# Patient Record
Sex: FEMALE | Race: WHITE | Employment: FULL TIME | URBAN - METROPOLITAN AREA
[De-identification: names, ages, dates, MRNs, and addresses within clinical notes are randomized per-mention and may not be internally consistent; named-entity substitution may affect disease eponyms.]

---

## 2017-05-11 ENCOUNTER — OFFICE VISIT (OUTPATIENT)
Dept: ORTHOPEDIC SURGERY | Age: 27
End: 2017-05-11

## 2017-05-11 VITALS — RESPIRATION RATE: 18 BRPM | DIASTOLIC BLOOD PRESSURE: 87 MMHG | SYSTOLIC BLOOD PRESSURE: 144 MMHG | TEMPERATURE: 98.1 F

## 2017-05-11 DIAGNOSIS — M54.12 CERVICAL RADICULAR PAIN: Primary | ICD-10-CM

## 2017-05-11 DIAGNOSIS — M54.2 NECK PAIN: ICD-10-CM

## 2017-05-11 RX ORDER — CYCLOBENZAPRINE HCL 10 MG
TABLET ORAL
Refills: 1 | COMMUNITY
Start: 2017-04-20 | End: 2017-05-11

## 2017-05-11 RX ORDER — GABAPENTIN 300 MG/1
CAPSULE ORAL
Qty: 60 CAP | Refills: 1 | Status: SHIPPED | OUTPATIENT
Start: 2017-05-11 | End: 2019-05-14 | Stop reason: SDUPTHER

## 2017-05-11 RX ORDER — METOPROLOL SUCCINATE 25 MG/1
12.5 TABLET, EXTENDED RELEASE ORAL
COMMUNITY
Start: 2014-02-24 | End: 2017-05-11

## 2017-05-11 RX ORDER — NAPROXEN 500 MG/1
TABLET ORAL
Qty: 60 TAB | Refills: 1 | Status: SHIPPED | OUTPATIENT
Start: 2017-05-11 | End: 2019-05-14 | Stop reason: CLARIF

## 2017-05-11 RX ORDER — IBUPROFEN 800 MG/1
800 TABLET ORAL
COMMUNITY
End: 2017-05-11

## 2017-05-11 NOTE — MR AVS SNAPSHOT
Visit Information Date & Time Provider Department Dept. Phone Encounter #  
 5/11/2017  2:30  [address], MD 4 [facility], [address] [facility] [phone] [id number] Follow-up Instructions Return for MRI/CT f/u. Upcoming Health Maintenance Date Due  
 HPV AGE 9Y-34Y (1 of 3 - Female 3 Dose Series) 11/17/2001 DTaP/Tdap/Td series (1 - Tdap) 11/17/2011 PAP AKA CERVICAL CYTOLOGY 11/17/2011 INFLUENZA AGE 9 TO ADULT 8/1/2017 Allergies as of 5/11/2017  Review Complete On: 5/11/2017 By: [address], MD  
 No Known Allergies Current Immunizations  Never Reviewed No immunizations on file. Not reviewed this visit You Were Diagnosed With   
  
 Codes Comments Cervical radicular pain    -  Primary ICD-10-CM: M54.12 
ICD-9-CM: 723.4 Neck pain     ICD-10-CM: M54.2 ICD-9-CM: 723.1 Vitals BP Temp Resp OB Status Smoking Status 144/87 (BP 1 Location: Right arm, BP Patient Position: Sitting) 98.1 °F (36.7 °C) (Oral) 18 Having regular periods Never Smoker Preferred Pharmacy Pharmacy Name Phone [address], [address] [place] [address] [phone] Your Updated Medication List  
  
   
This list is accurate as of: 5/11/17  3:46 PM.  Always use your most recent med list.  
  
  
  
  
 gabapentin 300 mg capsule Commonly known as:  NEURONTIN Take 1 cap po q hs x 1 week then increase to 2 caps po q hs  
  
 metoprolol tartrate 25 mg tablet Commonly known as:  LOPRESSOR Take 0.5 Tabs by mouth two (2) times a day. naproxen 500 mg tablet Commonly known as:  NAPROSYN Take 1 po q d-bid with food prn pain NEXPLANON 68 mg Impl Generic drug:  etonogestrel  
by SubDERmal route. Prescriptions Sent to Pharmacy Refills  
 gabapentin (NEURONTIN) 300 mg capsule 1 Sig: Take 1 cap po q hs x 1 week then increase to 2 caps po q hs  Class: Normal  
 Pharmacy: [id number], [address] Ph #: [phone]  
 naproxen (NAPROSYN) 500 mg tablet 1 Sig: Take 1 po q d-bid with food prn pain  
 Class: Normal  
 Pharmacy: [id number], [address] [facility] Ph #: [phone] We Performed the Following AMB POC XRAY, SPINE, CERVICAL; 4+ VIE [14535 CPT(R)] Follow-up Instructions Return for MRI/CT f/u. To-Do List   
 05/16/2017 Imaging:  MRI CERV SPINE WO CONT Patient Instructions Pinched Nerve in the Neck: Care Instructions Your Care Instructions A pinched nerve in the neck happens when a vertebra or disc in the upper part of your spine is damaged. This damage can happen because of an injury. Or it can just happen with age. The changes caused by the damage may put pressure on a nearby nerve root, pinching it. This causes symptoms such as sharp pain in your neck, shoulder, arm, or back. You may also have tingling or numbness. Sometimes it makes your arm weaker. The symptoms are usually worse when you turn your head or strain your neck. For many people, the symptoms get better over time and finally go away. Early treatment usually includes medicines for pain and swelling. Sometimes physical therapy and special exercises may help. Follow-up care is a key part of your treatment and safety. Be sure to make and go to all appointments, and call your doctor if you are having problems. It's also a good idea to know your test results and keep a list of the medicines you take. How can you care for yourself at home? · Be safe with medicines. Read and follow all instructions on the label. ¨ If the doctor gave you a prescription medicine for pain, take it as prescribed. ¨ If you are not taking a prescription pain medicine, ask your doctor if you can take an over-the-counter medicine. · Try using a heating pad on a low or medium setting for 15 to 20 minutes every 2 or 3 hours. Try a warm shower in place of one session with the heating pad. You can also buy single-use heat wraps that last up to 8 hours. · You can also try an ice pack for 10 to 15 minutes every 2 to 3 hours. There isn't strong evidence that either heat or ice will help. But you can try them to see if they help you. · Don't spend too long in one position. Take short breaks to move around and change positions. · Wear a seat belt and shoulder harness when you are in a car. · Sleep with a pillow under your head and neck that keeps your neck straight. · If you were given a neck brace (cervical collar) to limit neck motion, wear it as instructed for as many days as your doctor tells you to. Do not wear it longer than you were told to. Wearing a brace for too long can lead to neck stiffness and can weaken the neck muscles. · Follow your doctor's instructions for gentle neck-stretching exercises. · Do not smoke. Smoking can slow healing of your discs. If you need help quitting, talk to your doctor about stop-smoking programs and medicines. These can increase your chances of quitting for good. · Avoid strenuous work or exercise until your doctor says it is okay. When should you call for help? Call 911 anytime you think you may need emergency care. For example, call if: 
· You are unable to move an arm or a leg at all. Call your doctor now or seek immediate medical care if: 
· You have new or worse symptoms in your arms, legs, chest, belly, or buttocks. Symptoms may include: ¨ Numbness or tingling. ¨ Weakness. ¨ Pain. · You lose bladder or bowel control. Watch closely for changes in your health, and be sure to contact your doctor if: 
· You are not getting better as expected. Where can you learn more? Go to http://drew-mariya.info/.  
Enter W033 in the search box to learn more about \"Pinched Nerve in the Neck: Care Instructions. \" Current as of: May 23, 2016 Content Version: 11.2 © 4356-7853 [employer]. Care instructions adapted under license by [employer] (which disclaims liability or warranty for this information). If you have questions about a medical condition or this instruction, always ask your healthcare professional. [address] any warranty or liability for your use of this information. Introducing [facility]! Toribio Nyhan introduces Matchbox patient portal. Now you can access parts of your medical record, email your doctor's office, and request medication refills online. 1. In your internet browser, go to [email or web address] 2. Click on the First Time User? Click Here link in the Sign In box. You will see the New Member Sign Up page. 3. Enter your Matchbox Access Code exactly as it appears below. You will not need to use this code after youve completed the sign-up process. If you do not sign up before the expiration date, you must request a new code. · Matchbox Access Code: [email or web address] Expires: 8/9/2017  2:02 PM 
 
4. Enter the last four digits of your Social Security Number (xxxx) and Date of Birth (mm/dd/yyyy) as indicated and click Submit. You will be taken to the next sign-up page. 5. Create a Matchbox ID. This will be your Matchbox login ID and cannot be changed, so think of one that is secure and easy to remember. 6. Create a Matchbox password. You can change your password at any time. 7. Enter your Password Reset Question and Answer. This can be used at a later time if you forget your password. 8. Enter your e-mail address. You will receive e-mail notification when new information is available in [address] 9. Click Sign Up. You can now view and download portions of your medical record. 10. Click the Download Summary menu link to download a portable copy of your medical information. If you have questions, please visit the Frequently Asked Questions section of the [facility] website. Remember, [employer] is NOT to be used for urgent needs. For medical emergencies, dial 911. Now available from your iPhone and Android! Please provide this summary of care documentation to your next provider. Your primary care clinician is listed as NOT ON FILE. If you have any questions after today's visit, please call [phone].
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
## 2017-05-11 NOTE — PATIENT INSTRUCTIONS
Pinched Nerve in the Neck: Care Instructions  Your Care Instructions  A pinched nerve in the neck happens when a vertebra or disc in the upper part of your spine is damaged. This damage can happen because of an injury. Or it can just happen with age. The changes caused by the damage may put pressure on a nearby nerve root, pinching it. This causes symptoms such as sharp pain in your neck, shoulder, arm, or back. You may also have tingling or numbness. Sometimes it makes your arm weaker. The symptoms are usually worse when you turn your head or strain your neck. For many people, the symptoms get better over time and finally go away. Early treatment usually includes medicines for pain and swelling. Sometimes physical therapy and special exercises may help. Follow-up care is a key part of your treatment and safety. Be sure to make and go to all appointments, and call your doctor if you are having problems. It's also a good idea to know your test results and keep a list of the medicines you take. How can you care for yourself at home? · Be safe with medicines. Read and follow all instructions on the label. ¨ If the doctor gave you a prescription medicine for pain, take it as prescribed. ¨ If you are not taking a prescription pain medicine, ask your doctor if you can take an over-the-counter medicine. · Try using a heating pad on a low or medium setting for 15 to 20 minutes every 2 or 3 hours. Try a warm shower in place of one session with the heating pad. You can also buy single-use heat wraps that last up to 8 hours. · You can also try an ice pack for 10 to 15 minutes every 2 to 3 hours. There isn't strong evidence that either heat or ice will help. But you can try them to see if they help you. · Don't spend too long in one position. Take short breaks to move around and change positions. · Wear a seat belt and shoulder harness when you are in a car.   · Sleep with a pillow under your head and neck that keeps your neck straight. · If you were given a neck brace (cervical collar) to limit neck motion, wear it as instructed for as many days as your doctor tells you to. Do not wear it longer than you were told to. Wearing a brace for too long can lead to neck stiffness and can weaken the neck muscles. · Follow your doctor's instructions for gentle neck-stretching exercises. · Do not smoke. Smoking can slow healing of your discs. If you need help quitting, talk to your doctor about stop-smoking programs and medicines. These can increase your chances of quitting for good. · Avoid strenuous work or exercise until your doctor says it is okay. When should you call for help? Call 911 anytime you think you may need emergency care. For example, call if:  · You are unable to move an arm or a leg at all. Call your doctor now or seek immediate medical care if:  · You have new or worse symptoms in your arms, legs, chest, belly, or buttocks. Symptoms may include:  ¨ Numbness or tingling. ¨ Weakness. ¨ Pain. · You lose bladder or bowel control. Watch closely for changes in your health, and be sure to contact your doctor if:  · You are not getting better as expected. Where can you learn more? Go to http://drew-mariya.info/. Enter Q607 in the search box to learn more about \"Pinched Nerve in the Neck: Care Instructions. \"  Current as of: May 23, 2016  Content Version: 11.2  © 3551-6658 [employer]. Care instructions adapted under license by [employer] (which disclaims liability or warranty for this information). If you have questions about a medical condition or this instruction, always ask your healthcare professional. [place] [address] any warranty or liability for your use of this information.

## 2017-10-10 RX ORDER — GABAPENTIN 300 MG/1
600 CAPSULE ORAL
Qty: 60 CAP | Refills: 1 | OUTPATIENT
Start: 2017-10-10

## 2017-10-10 NOTE — TELEPHONE ENCOUNTER
Please contact the patient for scheduling per refusal reason below. Thanks.      Refused by: Sarika العلي NP  Refusal reason: Appt required, please call patient (MRI FU noted, has not been seen since May)

## 2017-10-11 NOTE — TELEPHONE ENCOUNTER
Both numbers on pt acct are disconnected if pt calls pls make appt for med refill see previous message to schedule

## 2018-12-03 ENCOUNTER — APPOINTMENT (OUTPATIENT)
Dept: GENERAL RADIOLOGY | Age: 28
End: 2018-12-03
Attending: PHYSICIAN ASSISTANT
Payer: SELF-PAY

## 2018-12-03 ENCOUNTER — HOSPITAL ENCOUNTER (EMERGENCY)
Age: 28
Discharge: HOME | End: 2018-12-03
Attending: EMERGENCY MEDICINE
Payer: SELF-PAY

## 2018-12-03 VITALS
OXYGEN SATURATION: 96 % | RESPIRATION RATE: 16 BRPM | BODY MASS INDEX: 45 KG/M2 | HEART RATE: 90 BPM | HEIGHT: 66 IN | DIASTOLIC BLOOD PRESSURE: 91 MMHG | WEIGHT: 280 LBS | SYSTOLIC BLOOD PRESSURE: 146 MMHG | TEMPERATURE: 97.7 F

## 2018-12-03 DIAGNOSIS — S93.402A SPRAIN OF LEFT ANKLE, UNSPECIFIED LIGAMENT, INITIAL ENCOUNTER: Primary | ICD-10-CM

## 2018-12-03 PROCEDURE — L4350 ANKLE CONTROL ORTHO PRE OTS: HCPCS

## 2018-12-03 PROCEDURE — 99283 EMERGENCY DEPT VISIT LOW MDM: CPT

## 2018-12-03 PROCEDURE — 73610 X-RAY EXAM OF ANKLE: CPT

## 2018-12-03 NOTE — ED PROVIDER NOTES
EMERGENCY DEPARTMENT HISTORY AND PHYSICAL EXAM    Date: (Not on file)  Patient Name: Jeremy Forde    History of Presenting Illness     Chief Complaint   Patient presents with    Ankle Pain         History Provided By: Patient    Chief Complaint: ankle pain  Duration: 1 Days  Timing:  Acute  Location: left ankle  Quality: Aching  Severity: Moderate  Modifying Factors: twisted ankle last night; walking worsens pain  Associated Symptoms: denies any other associated signs or symptoms      Additional History (Context): Jeremy Forde is a 29 y.o. female with No significant past medical history who presents with left ankle pain. Twisted ankle as she stepped into an uneven part of her driveway/hole when she got out of her car last evening. Report increased pain with weight bearing and swelling this morning. NO hx of fx but does have hx of left ankle sprains. Denies numbness, tingling. No other injuries or complaints. PCP: [place], Not On File    Current Outpatient Medications   Medication Sig Dispense Refill    gabapentin (NEURONTIN) 300 mg capsule Take 1 cap po q hs x 1 week then increase to 2 caps po q hs 60 Cap 1    naproxen (NAPROSYN) 500 mg tablet Take 1 po q d-bid with food prn pain 60 Tab 1    etonogestrel (NEXPLANON) 68 mg impl by SubDERmal route.  metoprolol (LOPRESSOR) 25 mg tablet Take 0.5 Tabs by mouth two (2) times a day.  27 Tab 6       Past History     Past Medical History:  Past Medical History:   Diagnosis Date    Anemia     Hypertension     pregnancy induced HTN    Mass of heart     Spon  w/o complication     SVT (supraventricular tachycardia) (HCC)     SVT (supraventricular tachycardia) (HCC)        Past Surgical History:  Past Surgical History:   Procedure Laterality Date    HX GYN      left ovarian cyst removed    HX SVT ABLATION      HX TONSIL AND ADENOIDECTOMY      HX TONSILLECTOMY  no date noted in HX    tonsils, adnoids & tues       Family History:  Family History   Problem Relation Age of Onset    Emphysema Mother     Cancer Neg Hx     Diabetes Neg Hx     Heart Disease Neg Hx     Hypertension Neg Hx     Stroke Neg Hx        Social History:  Social History     Tobacco Use    Smoking status: Never Smoker    Smokeless tobacco: Never Used   Substance Use Topics    Alcohol use: Yes     Alcohol/week: 1.2 oz     Types: 2 Shots of liquor per week    Drug use: No       Allergies:  No Known Allergies      Review of Systems   Review of Systems   Musculoskeletal: Positive for arthralgias and joint swelling. Skin: Negative for wound. All other systems reviewed and are negative. All Other Systems Negative  Physical Exam   There were no vitals filed for this visit. Physical Exam   Constitutional: She appears well-developed and well-nourished. No distress. HENT:   Head: Normocephalic and atraumatic. Mouth/Throat: Oropharynx is clear and moist.   Eyes: Conjunctivae are normal.   Neck: Normal range of motion. Musculoskeletal:        Left knee: Normal.        Right ankle: Normal.        Left ankle: She exhibits decreased range of motion and swelling. She exhibits no ecchymosis, no deformity, no laceration and normal pulse. Tenderness. Lateral malleolus tenderness found. No medial malleolus and no head of 5th metatarsal tenderness found. Achilles tendon normal.        Left foot: Normal.   Neurological: She is alert. Skin: Skin is warm and dry. She is not diaphoretic. Psychiatric: She has a normal mood and affect. Diagnostic Study Results     Labs -   No results found for this or any previous visit (from the past 12 hour(s)). Radiologic Studies -   XR ANKLE LT MIN 3 V    (Results Pending)     CT Results  (Last 48 hours)    None        CXR Results  (Last 48 hours)    None            Medical Decision Making   I am the first provider for this patient.     I reviewed the vital signs, available nursing notes, past medical history, past surgical history, family history and social history. Vital Signs-Reviewed the patient's vital signs. Records Reviewed: Nursing Notes    Procedures:  Procedures    Provider Notes (Medical Decision Making): pt c/o left ankle pain s/p stepping into uneven part of driveway last evening. +swelling and tenderness lateral joint. No fx on xray. Will air splint, crutches and give ortho f/u. nsaids for pain. Pt agrees with plan. MED RECONCILIATION:  No current facility-administered medications for this encounter. Current Outpatient Medications   Medication Sig    gabapentin (NEURONTIN) 300 mg capsule Take 1 cap po q hs x 1 week then increase to 2 caps po q hs    naproxen (NAPROSYN) 500 mg tablet Take 1 po q d-bid with food prn pain    etonogestrel (NEXPLANON) 68 mg impl by SubDERmal route.  metoprolol (LOPRESSOR) 25 mg tablet Take 0.5 Tabs by mouth two (2) times a day. Disposition:  D/c to home    DISCHARGE NOTE:     Pt has been reexamined. Patient has no new complaints, changes, or physical findings. Care plan outlined and precautions discussed. Results of xray were reviewed with the patient. All medications were reviewed with the patient; will d/c home with nsaids, crutches. All of pt's questions and concerns were addressed. Patient was instructed and agrees to follow up with pcp/ortho, as well as to return to the ED upon further deterioration. Patient is ready to go home. Follow-up Information    None         Current Discharge Medication List            Diagnosis     Clinical Impression: No diagnosis found.

## 2018-12-03 NOTE — ED TRIAGE NOTES
The patient presents for evaluation of left ankle pain that began last night. States, \"I was walking on my driveway and stepped in a hole. \"

## 2018-12-03 NOTE — DISCHARGE INSTRUCTIONS
Ankle Sprain: Care Instructions  Your Care Instructions    An ankle sprain can happen when you twist your ankle. The ligaments that support the ankle can get stretched and torn. Often the ankle is swollen and painful. Ankle sprains may take from several weeks to several months to heal. Usually, the more pain and swelling you have, the more severe your ankle sprain is and the longer it will take to heal. You can heal faster and regain strength in your ankle with good home treatment. It is very important to give your ankle time to heal completely, so that you do not easily hurt your ankle again. Follow-up care is a key part of your treatment and safety. Be sure to make and go to all appointments, and call your doctor if you are having problems. It's also a good idea to know your test results and keep a list of the medicines you take. How can you care for yourself at home? · Prop up your foot on pillows as much as possible for the next 3 days. Try to keep your ankle above the level of your heart. This will help reduce the swelling. · Follow your doctor's directions for wearing a splint or elastic bandage. Wrapping the ankle may help reduce or prevent swelling. · Your doctor may give you a splint, a brace, an air stirrup, or another form of ankle support to protect your ankle until it is healed. Wear it as directed while your ankle is healing. Do not remove it unless your doctor tells you to. After your ankle has healed, ask your doctor whether you should wear the brace when you exercise. · Put ice or cold packs on your injured ankle for 10 to 20 minutes at a time. Try to do this every 1 to 2 hours for the next 3 days (when you are awake) or until the swelling goes down. Put a thin cloth between the ice and your skin. · You may need to use crutches until you can walk without pain. If you do use crutches, try to bear some weight on your injured ankle if you can do so without pain.  This helps the ankle heal.  · Take pain medicines exactly as directed. ? If the doctor gave you a prescription medicine for pain, take it as prescribed. ? If you are not taking a prescription pain medicine, ask your doctor if you can take an over-the-counter medicine. · If you have been given ankle exercises to do at home, do them exactly as instructed. These can promote healing and help prevent lasting weakness. When should you call for help? Call your doctor now or seek immediate medical care if:    · Your pain is getting worse.     · Your swelling is getting worse.     · Your splint feels too tight or you are unable to loosen it.    Watch closely for changes in your health, and be sure to contact your doctor if:    · You are not getting better after 1 week. Where can you learn more? Go to http://drew-mariya.info/. Enter Q992 in the search box to learn more about \"Ankle Sprain: Care Instructions. \"  Current as of: November 29, 2017  Content Version: 11.8  © 2976-9071 [employer]. Care instructions adapted under license by [employer] (which disclaims liability or warranty for this information). If you have questions about a medical condition or this instruction, always ask your healthcare professional. [place] [address] any warranty or liability for your use of this information.

## 2018-12-03 NOTE — ED NOTES
I have reviewed discharge instructions with the patient. The patient verbalized understanding. Discharged home ambulatory with crutches, in stable condition.

## 2018-12-03 NOTE — ED NOTES
Aircast applied to left ankle. Instructed patient on proper use of crutches. Patient provided returned demonstration on proper use of crutches.

## 2019-04-23 ENCOUNTER — HOSPITAL ENCOUNTER (OUTPATIENT)
Dept: LAB | Age: 29
Discharge: HOME | End: 2019-04-23
Payer: MEDICAID

## 2019-04-23 ENCOUNTER — OFFICE VISIT (OUTPATIENT)
Dept: FAMILY MEDICINE CLINIC | Facility: CLINIC | Age: 29
End: 2019-04-23

## 2019-04-23 VITALS
WEIGHT: 286 LBS | HEART RATE: 83 BPM | OXYGEN SATURATION: 97 % | DIASTOLIC BLOOD PRESSURE: 80 MMHG | BODY MASS INDEX: 45.96 KG/M2 | HEIGHT: 66 IN | SYSTOLIC BLOOD PRESSURE: 152 MMHG | TEMPERATURE: 99.4 F | RESPIRATION RATE: 16 BRPM

## 2019-04-23 DIAGNOSIS — I47.1 SVT (SUPRAVENTRICULAR TACHYCARDIA) (HCC): ICD-10-CM

## 2019-04-23 DIAGNOSIS — Z12.4 CERVICAL CANCER SCREENING: ICD-10-CM

## 2019-04-23 DIAGNOSIS — F32.A DEPRESSION, UNSPECIFIED DEPRESSION TYPE: Primary | ICD-10-CM

## 2019-04-23 PROBLEM — F33.0 DEPRESSION, MAJOR, RECURRENT, MILD (HCC): Status: ACTIVE | Noted: 2019-04-23

## 2019-04-23 PROBLEM — E66.01 OBESITY, MORBID (HCC): Status: ACTIVE | Noted: 2019-04-23

## 2019-04-23 LAB
ALBUMIN SERPL-MCNC: 3.9 G/DL (ref 3.4–5)
ALBUMIN/GLOB SERPL: 1.1 {RATIO} (ref 0.8–1.7)
ALP SERPL-CCNC: 71 U/L (ref 45–117)
ALT SERPL-CCNC: 27 U/L (ref 13–56)
ANION GAP SERPL CALC-SCNC: 7 MMOL/L (ref 3–18)
AST SERPL-CCNC: 14 U/L (ref 15–37)
BILIRUB SERPL-MCNC: 0.5 MG/DL (ref 0.2–1)
BUN SERPL-MCNC: 10 MG/DL (ref 7–18)
BUN/CREAT SERPL: 11 (ref 12–20)
CALCIUM SERPL-MCNC: 9.3 MG/DL (ref 8.5–10.1)
CHLORIDE SERPL-SCNC: 108 MMOL/L (ref 100–108)
CHOLEST SERPL-MCNC: 226 MG/DL
CO2 SERPL-SCNC: 24 MMOL/L (ref 21–32)
CREAT SERPL-MCNC: 0.91 MG/DL (ref 0.6–1.3)
GLOBULIN SER CALC-MCNC: 3.4 G/DL (ref 2–4)
GLUCOSE SERPL-MCNC: 89 MG/DL (ref 74–99)
HDLC SERPL-MCNC: 39 MG/DL (ref 40–60)
HDLC SERPL: 5.8 {RATIO} (ref 0–5)
LDLC SERPL CALC-MCNC: 131.8 MG/DL (ref 0–100)
LIPID PROFILE,FLP: ABNORMAL
POTASSIUM SERPL-SCNC: 4.3 MMOL/L (ref 3.5–5.5)
PROT SERPL-MCNC: 7.3 G/DL (ref 6.4–8.2)
SODIUM SERPL-SCNC: 139 MMOL/L (ref 136–145)
TRIGL SERPL-MCNC: 276 MG/DL (ref ?–150)
VLDLC SERPL CALC-MCNC: 55.2 MG/DL

## 2019-04-23 PROCEDURE — 85025 COMPLETE CBC W/AUTO DIFF WBC: CPT

## 2019-04-23 PROCEDURE — 36415 COLL VENOUS BLD VENIPUNCTURE: CPT

## 2019-04-23 PROCEDURE — 80053 COMPREHEN METABOLIC PANEL: CPT

## 2019-04-23 PROCEDURE — 80061 LIPID PANEL: CPT

## 2019-04-23 RX ORDER — METOPROLOL TARTRATE 25 MG/1
12.5 TABLET, FILM COATED ORAL 2 TIMES DAILY
Qty: 30 TAB | Refills: 6 | Status: SHIPPED | OUTPATIENT
Start: 2019-04-23 | End: 2020-07-17 | Stop reason: SDUPTHER

## 2019-04-23 RX ORDER — ESCITALOPRAM OXALATE 10 MG/1
10 TABLET ORAL DAILY
Qty: 30 TAB | Refills: 1 | Status: SHIPPED | OUTPATIENT
Start: 2019-04-23 | End: 2019-05-14 | Stop reason: SDUPTHER

## 2019-04-23 NOTE — PROGRESS NOTES
Chief Complaint   Patient presents with    New Patient    Depression         Is someone accompanying this pt? no    Is the patient using any DME equipment during OV? no    Depression Screening:  3 most recent PHQ Screens 4/23/2019   Little interest or pleasure in doing things Several days   Feeling down, depressed, irritable, or hopeless Nearly every day   Total Score PHQ 2 4   Trouble falling or staying asleep, or sleeping too much More than half the days   Feeling tired or having little energy More than half the days   Poor appetite, weight loss, or overeating Several days   Feeling bad about yourself - or that you are a failure or have let yourself or your family down Nearly every day   Trouble concentrating on things such as school, work, reading, or watching TV Not at all   Moving or speaking so slowly that other people could have noticed; or the opposite being so fidgety that others notice Not at all   Thoughts of being better off dead, or hurting yourself in some way Not at all   PHQ 9 Score 12   How difficult have these problems made it for you to do your work, take care of your home and get along with others Very difficult           Health Maintenance reviewed and discussed per provider. Pt is due for   Health Maintenance Due   Topic    DTaP/Tdap/Td series (1 - Tdap)    PAP AKA CERVICAL CYTOLOGY     Please order/place referral if appropriate. Pt currently taking Antiplatelet therapy? no      Coordination of Care:  1. Have you been to the ER, urgent care clinic since your last visit? Hospitalized since your last visit? no    2. Have you seen or consulted any other health care providers outside of the [address] [place] [address] since your last visit? Include any pap smears or colon screening. no    Please see Red banners under Allergies, Med rec, Immunizations to remove outside inquires. All correct information has been verified with patient and added to chart.

## 2019-04-23 NOTE — PROGRESS NOTES
Rebeca Morales is a 29 y.o. female presenting today for New Patient and Depression  . HPI:  Rebeca Morales presents to the office today to establish care with the practice. Has a past medical history for palpitations, depression, obesity and supraventricular tachycardia. She presents today stating she feels well. She reports she is here to establish care with a new PCP. She denies any chest pain, palpitation or lower extremity edema. She denies any cough, wheezing or abdominal pain. She is negative for headache or dizziness. Patient reports she is currently taking metoprolol/Lopressor for her SVT. Review of Systems   Respiratory: Negative for cough and hemoptysis. Cardiovascular: Positive for palpitations. Negative for chest pain and leg swelling. Neurological: Negative for dizziness and headaches. Psychiatric/Behavioral: Positive for depression. No Known Allergies    Current Outpatient Medications   Medication Sig Dispense Refill    metoprolol tartrate (LOPRESSOR) 25 mg tablet Take 0.5 Tabs by mouth two (2) times a day. 30 Tab 6    escitalopram oxalate (LEXAPRO) 10 mg tablet Take 1 Tab by mouth daily. 30 Tab 1    etonogestrel (NEXPLANON) 68 mg impl by SubDERmal route.       gabapentin (NEURONTIN) 300 mg capsule Take 1 cap po q hs x 1 week then increase to 2 caps po q hs 60 Cap 1    naproxen (NAPROSYN) 500 mg tablet Take 1 po q d-bid with food prn pain 60 Tab 1       Past Medical History:   Diagnosis Date    Anemia     Hypertension     pregnancy induced HTN    Mass of heart     Spon  w/o complication     SVT (supraventricular tachycardia) (HCC)     SVT (supraventricular tachycardia) (HCC)        Past Surgical History:   Procedure Laterality Date    HX GYN      left ovarian cyst removed    HX SVT ABLATION      HX TONSIL AND ADENOIDECTOMY      HX TONSILLECTOMY  no date noted in HX    tonsils, adnoids & tues       Social History     Socioeconomic History    Marital status:      Spouse name: Not on file    Number of children: Not on file    Years of education: Not on file    Highest education level: Not on file   Occupational History    Not on file   Social Needs    Financial resource strain: Not on file    Food insecurity:     Worry: Not on file     Inability: Not on file    Transportation needs:     Medical: Not on file     Non-medical: Not on file   Tobacco Use    Smoking status: Never Smoker    Smokeless tobacco: Never Used   Substance and Sexual Activity    Alcohol use: Yes     Alcohol/week: 1.2 oz     Types: 2 Shots of liquor per week    Drug use: No    Sexual activity: Yes     Partners: Male     Birth control/protection: None   Lifestyle    Physical activity:     Days per week: Not on file     Minutes per session: Not on file    Stress: Not on file   Relationships    Social connections:     Talks on phone: Not on file     Gets together: Not on file     Attends Yazidism service: Not on file     Active member of club or organization: Not on file     Attends meetings of clubs or organizations: Not on file     Relationship status: Not on file    Intimate partner violence:     Fear of current or ex partner: Not on file     Emotionally abused: Not on file     Physically abused: Not on file     Forced sexual activity: Not on file   Other Topics Concern    Not on file   Social History Narrative    ** Merged History Encounter **            Patient does not have an advanced directive on file    Vitals:    04/23/19 1419   BP: 152/80   Pulse: 83   Resp: 16   Temp: 99.4 °F (37.4 °C)   TempSrc: Tympanic   SpO2: 97%   Weight: 286 lb (129.7 kg)   Height: 5' 6\" (1.676 m)   PainSc:   6   PainLoc: Back   LMP: 04/01/2019       Physical Exam   Cardiovascular: Normal rate, regular rhythm and normal heart sounds. Pulmonary/Chest: Effort normal and breath sounds normal.   Psychiatric: She is agitated.    Nursing note and vitals reviewed. Hospital Outpatient Visit on 04/23/2019   Component Date Value Ref Range Status    WBC 04/23/2019 8.0  4.6 - 13.2 K/uL Final    RBC 04/23/2019 4.96  4.20 - 5.30 M/uL Final    HGB 04/23/2019 14.3  12.0 - 16.0 g/dL Final    HCT 04/23/2019 43.9  35.0 - 45.0 % Final    MCV 04/23/2019 88.5  74.0 - 97.0 FL Final    MCH 04/23/2019 28.8  24.0 - 34.0 PG Final    MCHC 04/23/2019 32.6  31.0 - 37.0 g/dL Final    RDW 04/23/2019 13.0  11.6 - 14.5 % Final    PLATELET 26/76/8744 252  135 - 420 K/uL Final    MPV 04/23/2019 10.7  9.2 - 11.8 FL Final    NEUTROPHILS 04/23/2019 59  40 - 73 % Final    LYMPHOCYTES 04/23/2019 31  21 - 52 % Final    MONOCYTES 04/23/2019 6  3 - 10 % Final    EOSINOPHILS 04/23/2019 3  0 - 5 % Final    BASOPHILS 04/23/2019 1  0 - 2 % Final    ABS. NEUTROPHILS 04/23/2019 4.8  1.8 - 8.0 K/UL Final    ABS. LYMPHOCYTES 04/23/2019 2.5  0.9 - 3.6 K/UL Final    ABS. MONOCYTES 04/23/2019 0.5  0.05 - 1.2 K/UL Final    ABS. EOSINOPHILS 04/23/2019 0.3  0.0 - 0.4 K/UL Final    ABS. BASOPHILS 04/23/2019 0.1  0.0 - 0.1 K/UL Final    DF 04/23/2019 AUTOMATED    Final    LIPID PROFILE 04/23/2019        Final    Cholesterol, total 04/23/2019 226* <200 MG/DL Final    Triglyceride 04/23/2019 276* <150 MG/DL Final    Comment: The drugs N-acetylcysteine (NAC) and  Metamiszole have been found to cause falsely  low results in this chemical assay. Please  be sure to submit blood samples obtained  BEFORE administration of either of these  drugs to assure correct results.       HDL Cholesterol 04/23/2019 39* 40 - 60 MG/DL Final    LDL, calculated 04/23/2019 131.8* 0 - 100 MG/DL Final    VLDL, calculated 04/23/2019 55.2  MG/DL Final    CHOL/HDL Ratio 04/23/2019 5.8* 0 - 5.0   Final    Sodium 04/23/2019 139  136 - 145 mmol/L Final    Potassium 04/23/2019 4.3  3.5 - 5.5 mmol/L Final    Chloride 04/23/2019 108  100 - 108 mmol/L Final    CO2 04/23/2019 24  21 - 32 mmol/L Final    Anion gap 04/23/2019 7  3.0 - 18 mmol/L Final    Glucose 04/23/2019 89  74 - 99 mg/dL Final    BUN 04/23/2019 10  7.0 - 18 MG/DL Final    Creatinine 04/23/2019 0.91  0.6 - 1.3 MG/DL Final    BUN/Creatinine ratio 04/23/2019 11* 12 - 20   Final    GFR est AA 04/23/2019 >60  >60 ml/min/1.73m2 Final    GFR est non-AA 04/23/2019 >60  >60 ml/min/1.73m2 Final    Comment: (NOTE)  Estimated GFR is calculated using the Modification of Diet in Renal   Disease (MDRD) Study equation, reported for both  Americans   (GFRAA) and non- Americans (GFRNA), and normalized to 1.73m2   body surface area. The physician must decide which value applies to   the patient. The MDRD study equation should only be used in   individuals age 25 or older. It has not been validated for the   following: pregnant women, patients with serious comorbid conditions,   or on certain medications, or persons with extremes of body size,   muscle mass, or nutritional status.  Calcium 04/23/2019 9.3  8.5 - 10.1 MG/DL Final    Bilirubin, total 04/23/2019 0.5  0.2 - 1.0 MG/DL Final    ALT (SGPT) 04/23/2019 27  13 - 56 U/L Final    AST (SGOT) 04/23/2019 14* 15 - 37 U/L Final    Alk.  phosphatase 04/23/2019 71  45 - 117 U/L Final    Protein, total 04/23/2019 7.3  6.4 - 8.2 g/dL Final    Albumin 04/23/2019 3.9  3.4 - 5.0 g/dL Final    Globulin 04/23/2019 3.4  2.0 - 4.0 g/dL Final    A-G Ratio 04/23/2019 1.1  0.8 - 1.7   Final       .  Results for orders placed or performed during the hospital encounter of 04/23/19   CBC WITH AUTOMATED DIFF   Result Value Ref Range    WBC 8.0 4.6 - 13.2 K/uL    RBC 4.96 4.20 - 5.30 M/uL    HGB 14.3 12.0 - 16.0 g/dL    HCT 43.9 35.0 - 45.0 %    MCV 88.5 74.0 - 97.0 FL    MCH 28.8 24.0 - 34.0 PG    MCHC 32.6 31.0 - 37.0 g/dL    RDW 13.0 11.6 - 14.5 %    PLATELET 098 700 - 387 K/uL    MPV 10.7 9.2 - 11.8 FL    NEUTROPHILS 59 40 - 73 %    LYMPHOCYTES 31 21 - 52 %    MONOCYTES 6 3 - 10 %    EOSINOPHILS 3 0 - 5 % BASOPHILS 1 0 - 2 %    ABS. NEUTROPHILS 4.8 1.8 - 8.0 K/UL    ABS. LYMPHOCYTES 2.5 0.9 - 3.6 K/UL    ABS. MONOCYTES 0.5 0.05 - 1.2 K/UL    ABS. EOSINOPHILS 0.3 0.0 - 0.4 K/UL    ABS. BASOPHILS 0.1 0.0 - 0.1 K/UL    DF AUTOMATED     LIPID PANEL   Result Value Ref Range    LIPID PROFILE          Cholesterol, total 226 (H) <200 MG/DL    Triglyceride 276 (H) <150 MG/DL    HDL Cholesterol 39 (L) 40 - 60 MG/DL    LDL, calculated 131.8 (H) 0 - 100 MG/DL    VLDL, calculated 55.2 MG/DL    CHOL/HDL Ratio 5.8 (H) 0 - 5.0     METABOLIC PANEL, COMPREHENSIVE   Result Value Ref Range    Sodium 139 136 - 145 mmol/L    Potassium 4.3 3.5 - 5.5 mmol/L    Chloride 108 100 - 108 mmol/L    CO2 24 21 - 32 mmol/L    Anion gap 7 3.0 - 18 mmol/L    Glucose 89 74 - 99 mg/dL    BUN 10 7.0 - 18 MG/DL    Creatinine 0.91 0.6 - 1.3 MG/DL    BUN/Creatinine ratio 11 (L) 12 - 20      GFR est AA >60 >60 ml/min/1.73m2    GFR est non-AA >60 >60 ml/min/1.73m2    Calcium 9.3 8.5 - 10.1 MG/DL    Bilirubin, total 0.5 0.2 - 1.0 MG/DL    ALT (SGPT) 27 13 - 56 U/L    AST (SGOT) 14 (L) 15 - 37 U/L    Alk. phosphatase 71 45 - 117 U/L    Protein, total 7.3 6.4 - 8.2 g/dL    Albumin 3.9 3.4 - 5.0 g/dL    Globulin 3.4 2.0 - 4.0 g/dL    A-G Ratio 1.1 0.8 - 1.7         Assessment / Plan:      ICD-10-CM ICD-9-CM    1. Depression, unspecified depression type F32.9 311 escitalopram oxalate (LEXAPRO) 10 mg tablet   2. SVT (supraventricular tachycardia) (HCC) I47.1 427.89 metoprolol tartrate (LOPRESSOR) 25 mg tablet      CBC WITH AUTOMATED DIFF      LIPID PANEL      METABOLIC PANEL, COMPREHENSIVE   3. Cervical cancer screening Z12.4 V76.2 REFERRAL TO OBSTETRICS AND GYNECOLOGY     Depression- Lexapro rx  Fasting labs   Metoprolol rx refilled  F/u prn      Follow-up and Dispositions    · Return in about 3 weeks (around 5/14/2019). I asked the patient if she  had any questions and answered her  questions.   The patient stated that she understands the treatment plan and agrees with the treatment plan

## 2019-04-24 LAB
BASOPHILS # BLD: 0.1 K/UL (ref 0–0.1)
BASOPHILS NFR BLD: 1 % (ref 0–2)
DIFFERENTIAL METHOD BLD: NORMAL
EOSINOPHIL # BLD: 0.3 K/UL (ref 0–0.4)
EOSINOPHIL NFR BLD: 3 % (ref 0–5)
ERYTHROCYTE [DISTWIDTH] IN BLOOD BY AUTOMATED COUNT: 13 % (ref 11.6–14.5)
HCT VFR BLD AUTO: 43.9 % (ref 35–45)
HGB BLD-MCNC: 14.3 G/DL (ref 12–16)
LYMPHOCYTES # BLD: 2.5 K/UL (ref 0.9–3.6)
LYMPHOCYTES NFR BLD: 31 % (ref 21–52)
MCH RBC QN AUTO: 28.8 PG (ref 24–34)
MCHC RBC AUTO-ENTMCNC: 32.6 G/DL (ref 31–37)
MCV RBC AUTO: 88.5 FL (ref 74–97)
MONOCYTES # BLD: 0.5 K/UL (ref 0.05–1.2)
MONOCYTES NFR BLD: 6 % (ref 3–10)
NEUTS SEG # BLD: 4.8 K/UL (ref 1.8–8)
NEUTS SEG NFR BLD: 59 % (ref 40–73)
PLATELET # BLD AUTO: 286 K/UL (ref 135–420)
PMV BLD AUTO: 10.7 FL (ref 9.2–11.8)
RBC # BLD AUTO: 4.96 M/UL (ref 4.2–5.3)
WBC # BLD AUTO: 8 K/UL (ref 4.6–13.2)

## 2019-04-25 NOTE — PROGRESS NOTES
Please let the patient know her choesterolis very high. She will need to modify her diet. I would like to repeat labs in 3 months. Lyndon Carreon

## 2019-04-30 ENCOUNTER — TELEPHONE (OUTPATIENT)
Dept: FAMILY MEDICINE CLINIC | Facility: CLINIC | Age: 29
End: 2019-04-30

## 2019-04-30 NOTE — TELEPHONE ENCOUNTER
I have attempted to contact this patient by phone with the following results: left message to return my call on answering machine.  In reference to message below

## 2019-04-30 NOTE — TELEPHONE ENCOUNTER
----- Message from Jacki Portillo NP sent at 4/25/2019 12:49 AM EDT -----  Please let the patient know her choesterolis very high. She will need to modify her diet. I would like to repeat labs in 3 months. Jamel Andino Quality 134: Screening For Clinical Depression And Follow-Up Plan: Depression Screening not documented, reason not given Detail Level: Detailed Quality 154 Part A: Falls: Risk Assessment (Should Be Reported With Measure 155.): Falls risk assessment completed and documented in the past 12 months. Quality 111:Pneumonia Vaccination Status For Older Adults: Pneumococcal Vaccination not Administered or Previously Received, Reason not Otherwise Specified Quality 226: Preventive Care And Screening: Tobacco Use: Screening And Cessation Intervention: Patient screened for tobacco and never smoked Quality 130: Documentation Of Current Medications In The Medical Record: Current Medications Documented Quality 110: Preventive Care And Screening: Influenza Immunization: Influenza Immunization Administered during Influenza season Quality 431: Preventive Care And Screening: Unhealthy Alcohol Use - Screening: Patient screened for unhealthy alcohol use using a single question and scores less than 2 times per year

## 2019-05-14 ENCOUNTER — OFFICE VISIT (OUTPATIENT)
Dept: FAMILY MEDICINE CLINIC | Facility: CLINIC | Age: 29
End: 2019-05-14

## 2019-05-14 VITALS
WEIGHT: 276.8 LBS | TEMPERATURE: 98.8 F | RESPIRATION RATE: 16 BRPM | DIASTOLIC BLOOD PRESSURE: 74 MMHG | HEART RATE: 68 BPM | HEIGHT: 66 IN | SYSTOLIC BLOOD PRESSURE: 116 MMHG | OXYGEN SATURATION: 98 % | BODY MASS INDEX: 44.48 KG/M2

## 2019-05-14 DIAGNOSIS — F32.A DEPRESSION, UNSPECIFIED DEPRESSION TYPE: ICD-10-CM

## 2019-05-14 DIAGNOSIS — E66.01 MORBID OBESITY WITH BMI OF 40.0-44.9, ADULT (HCC): ICD-10-CM

## 2019-05-14 DIAGNOSIS — I10 ESSENTIAL HYPERTENSION: ICD-10-CM

## 2019-05-14 DIAGNOSIS — M54.50 LUMBAR PAIN: Primary | ICD-10-CM

## 2019-05-14 PROBLEM — E78.5 HYPERLIPIDEMIA: Status: ACTIVE | Noted: 2019-05-14

## 2019-05-14 RX ORDER — MELOXICAM 15 MG/1
15 TABLET ORAL DAILY
Qty: 10 TAB | Refills: 0 | Status: SHIPPED | OUTPATIENT
Start: 2019-05-14 | End: 2020-09-23

## 2019-05-14 RX ORDER — GABAPENTIN 300 MG/1
CAPSULE ORAL
Qty: 60 CAP | Refills: 1 | Status: SHIPPED | OUTPATIENT
Start: 2019-05-14 | End: 2020-09-23

## 2019-05-14 RX ORDER — ESCITALOPRAM OXALATE 20 MG/1
20 TABLET ORAL DAILY
Qty: 30 TAB | Refills: 1 | Status: SHIPPED | OUTPATIENT
Start: 2019-05-14 | End: 2019-10-10 | Stop reason: SDUPTHER

## 2019-05-14 NOTE — PROGRESS NOTES
Edyta Cruz is a 29 y.o. female presenting today for Depression (3 week follow up)  . HPI:  Edyta Cruz presents to the office today for follow-up on depression after starting Lexapro 10 mg daily on 4/23/2019. Patient reports she\" thinks the medication is working\". She states her kids have told her she has been interacting with them more. She reports she continued to have a hard time getting out of bed due to referred back pain. Patient states she is seen by her orthopedic doctor for back pain and has been prescribed Neurontin and naproxen. She is requesting a refill since she has not seen him recently due to lack of insurance. Discussed recent lipid panel with patient, cholesterol 226 and triglycerides 276. Discussed low-fat diet and exercise with patient. Blood pressure today 116/74 which has improved since she re-started taking her metoprolol. Patient requesting weight loss medication. Advised patient cardiac clearance is needed prior to initiating weight loss medication. Patient denies chest pain, palpitations, shortness of breath, headaches, or dizziness. Review of Systems   Constitutional: Negative for chills and fever. HENT: Negative for congestion and sinus pain. Respiratory: Negative for shortness of breath. Cardiovascular: Negative for chest pain and palpitations. Gastrointestinal: Negative for abdominal pain, constipation, diarrhea, nausea and vomiting. Neurological: Negative for dizziness and headaches. Psychiatric/Behavioral: Positive for depression. Negative for suicidal ideas. No Known Allergies    Current Outpatient Medications   Medication Sig Dispense Refill    meloxicam (MOBIC) 15 mg tablet Take 1 Tab by mouth daily. 10 Tab 0    escitalopram oxalate (LEXAPRO) 20 mg tablet Take 1 Tab by mouth daily.  30 Tab 1    gabapentin (NEURONTIN) 300 mg capsule Take 1 cap po q hs x 1 week then increase to 2 caps po q hs 60 Cap 1    metoprolol tartrate (LOPRESSOR) 25 mg tablet Take 0.5 Tabs by mouth two (2) times a day. 30 Tab 6    etonogestrel (NEXPLANON) 68 mg impl by SubDERmal route. Past Medical History:   Diagnosis Date    Anemia     Hypertension     pregnancy induced HTN    Mass of heart     Spon  w/o complication     SVT (supraventricular tachycardia) (HCC)     SVT (supraventricular tachycardia) (HCC)        Past Surgical History:   Procedure Laterality Date    HX GYN      left ovarian cyst removed    HX SVT ABLATION      HX TONSIL AND ADENOIDECTOMY      HX TONSILLECTOMY  no date noted in HX    tonsils, adnoids & tues       Social History     Socioeconomic History    Marital status:      Spouse name: Not on file    Number of children: Not on file    Years of education: Not on file    Highest education level: Not on file   Occupational History    Not on file   Social Needs    Financial resource strain: Not on file    Food insecurity:     Worry: Not on file     Inability: Not on file    Transportation needs:     Medical: Not on file     Non-medical: Not on file   Tobacco Use    Smoking status: Never Smoker    Smokeless tobacco: Never Used   Substance and Sexual Activity    Alcohol use:  Yes     Alcohol/week: 1.2 oz     Types: 2 Shots of liquor per week    Drug use: No    Sexual activity: Yes     Partners: Male     Birth control/protection: None   Lifestyle    Physical activity:     Days per week: Not on file     Minutes per session: Not on file    Stress: Not on file   Relationships    Social connections:     Talks on phone: Not on file     Gets together: Not on file     Attends Holiness service: Not on file     Active member of club or organization: Not on file     Attends meetings of clubs or organizations: Not on file     Relationship status: Not on file    Intimate partner violence:     Fear of current or ex partner: Not on file     Emotionally abused: Not on file     Physically abused: Not on file Forced sexual activity: Not on file   Other Topics Concern    Not on file   Social History Narrative    ** Merged History Encounter **            Patient does not have an advanced directive on file    Vitals:    05/14/19 1233   BP: 116/74   Pulse: 68   Resp: 16   Temp: 98.8 °F (37.1 °C)   TempSrc: Tympanic   SpO2: 98%   Weight: 276 lb 12.8 oz (125.6 kg)   Height: 5' 6\" (1.676 m)   PainSc:   7   PainLoc: Back   LMP: 04/01/2019       Physical Exam   Constitutional: She is oriented to person, place, and time and well-developed, well-nourished, and in no distress. HENT:   Head: Normocephalic. Eyes: Pupils are equal, round, and reactive to light. Neck: Normal range of motion. Cardiovascular: Normal rate and regular rhythm. Pulmonary/Chest: Effort normal and breath sounds normal.   Abdominal: Soft. Musculoskeletal: Normal range of motion. Neurological: She is alert and oriented to person, place, and time. Skin: Skin is warm and dry. Psychiatric: She exhibits a depressed mood. Hospital Outpatient Visit on 04/23/2019   Component Date Value Ref Range Status    WBC 04/23/2019 8.0  4.6 - 13.2 K/uL Final    RBC 04/23/2019 4.96  4.20 - 5.30 M/uL Final    HGB 04/23/2019 14.3  12.0 - 16.0 g/dL Final    HCT 04/23/2019 43.9  35.0 - 45.0 % Final    MCV 04/23/2019 88.5  74.0 - 97.0 FL Final    MCH 04/23/2019 28.8  24.0 - 34.0 PG Final    MCHC 04/23/2019 32.6  31.0 - 37.0 g/dL Final    RDW 04/23/2019 13.0  11.6 - 14.5 % Final    PLATELET 62/89/1855 375  135 - 420 K/uL Final    MPV 04/23/2019 10.7  9.2 - 11.8 FL Final    NEUTROPHILS 04/23/2019 59  40 - 73 % Final    LYMPHOCYTES 04/23/2019 31  21 - 52 % Final    MONOCYTES 04/23/2019 6  3 - 10 % Final    EOSINOPHILS 04/23/2019 3  0 - 5 % Final    BASOPHILS 04/23/2019 1  0 - 2 % Final    ABS. NEUTROPHILS 04/23/2019 4.8  1.8 - 8.0 K/UL Final    ABS. LYMPHOCYTES 04/23/2019 2.5  0.9 - 3.6 K/UL Final    ABS.  MONOCYTES 04/23/2019 0.5  0.05 - 1.2 K/UL Final    ABS. EOSINOPHILS 04/23/2019 0.3  0.0 - 0.4 K/UL Final    ABS. BASOPHILS 04/23/2019 0.1  0.0 - 0.1 K/UL Final    DF 04/23/2019 AUTOMATED    Final    LIPID PROFILE 04/23/2019        Final    Cholesterol, total 04/23/2019 226* <200 MG/DL Final    Triglyceride 04/23/2019 276* <150 MG/DL Final    Comment: The drugs N-acetylcysteine (NAC) and  Metamiszole have been found to cause falsely  low results in this chemical assay. Please  be sure to submit blood samples obtained  BEFORE administration of either of these  drugs to assure correct results.  HDL Cholesterol 04/23/2019 39* 40 - 60 MG/DL Final    LDL, calculated 04/23/2019 131.8* 0 - 100 MG/DL Final    VLDL, calculated 04/23/2019 55.2  MG/DL Final    CHOL/HDL Ratio 04/23/2019 5.8* 0 - 5.0   Final    Sodium 04/23/2019 139  136 - 145 mmol/L Final    Potassium 04/23/2019 4.3  3.5 - 5.5 mmol/L Final    Chloride 04/23/2019 108  100 - 108 mmol/L Final    CO2 04/23/2019 24  21 - 32 mmol/L Final    Anion gap 04/23/2019 7  3.0 - 18 mmol/L Final    Glucose 04/23/2019 89  74 - 99 mg/dL Final    BUN 04/23/2019 10  7.0 - 18 MG/DL Final    Creatinine 04/23/2019 0.91  0.6 - 1.3 MG/DL Final    BUN/Creatinine ratio 04/23/2019 11* 12 - 20   Final    GFR est AA 04/23/2019 >60  >60 ml/min/1.73m2 Final    GFR est non-AA 04/23/2019 >60  >60 ml/min/1.73m2 Final    Comment: (NOTE)  Estimated GFR is calculated using the Modification of Diet in Renal   Disease (MDRD) Study equation, reported for both  Americans   (GFRAA) and non- Americans (GFRNA), and normalized to 1.73m2   body surface area. The physician must decide which value applies to   the patient. The MDRD study equation should only be used in   individuals age 25 or older.  It has not been validated for the   following: pregnant women, patients with serious comorbid conditions,   or on certain medications, or persons with extremes of body size,   muscle mass, or nutritional status.  Calcium 04/23/2019 9.3  8.5 - 10.1 MG/DL Final    Bilirubin, total 04/23/2019 0.5  0.2 - 1.0 MG/DL Final    ALT (SGPT) 04/23/2019 27  13 - 56 U/L Final    AST (SGOT) 04/23/2019 14* 15 - 37 U/L Final    Alk. phosphatase 04/23/2019 71  45 - 117 U/L Final    Protein, total 04/23/2019 7.3  6.4 - 8.2 g/dL Final    Albumin 04/23/2019 3.9  3.4 - 5.0 g/dL Final    Globulin 04/23/2019 3.4  2.0 - 4.0 g/dL Final    A-G Ratio 04/23/2019 1.1  0.8 - 1.7   Final       .No results found for any visits on 05/14/19. Assessment / Plan:      ICD-10-CM ICD-9-CM    1. Lumbar pain M54.5 724.2 meloxicam (MOBIC) 15 mg tablet      gabapentin (NEURONTIN) 300 mg capsule   2. Depression, unspecified depression type F32.9 311 escitalopram oxalate (LEXAPRO) 20 mg tablet   3. Essential hypertension I10 401.9    4. Morbid obesity with BMI of 40.0-44.9, adult (HCC) E66.01 278.01     Z68.41 V85.41          Depression-Lexapro increased to 20 mg daily  Hypertension-no changes continue current regimen  Hyperlipidemia-discussed low-fat diet, portion control, and exercise  Pain-refill Neurontin, meloxicam for 10days until seen by orthopedic doctor  Previous referral to OB/GYN to remove Nexplanon, check to see why patient has not been called. Return to clinic in 2 months after patient has seen cardiologist.  Patient reports cardiology appointment on June 25, 2019. Follow-up and Dispositions    · Return if symptoms worsen or fail to improve. I asked the patient if she  had any questions and answered her  questions. The patient stated that she understands the treatment plan and agrees with the treatment plan    This document was created with a voice activated dictation system and may contain transcription errors.

## 2019-07-23 ENCOUNTER — OFFICE VISIT (OUTPATIENT)
Dept: CARDIOLOGY CLINIC | Age: 29
End: 2019-07-23

## 2019-07-23 VITALS
DIASTOLIC BLOOD PRESSURE: 67 MMHG | HEART RATE: 55 BPM | SYSTOLIC BLOOD PRESSURE: 124 MMHG | BODY MASS INDEX: 45.19 KG/M2 | HEIGHT: 66 IN | WEIGHT: 281.2 LBS

## 2019-07-23 DIAGNOSIS — R00.2 PALPITATIONS: ICD-10-CM

## 2019-07-23 DIAGNOSIS — I47.1 SVT (SUPRAVENTRICULAR TACHYCARDIA) (HCC): Primary | ICD-10-CM

## 2019-07-23 DIAGNOSIS — E66.01 OBESITY, MORBID (HCC): ICD-10-CM

## 2019-07-23 NOTE — PROGRESS NOTES
HISTORY OF PRESENT ILLNESS  Moo Muñoz is a 29 y.o. female. Palpitations    The history is provided by the patient. This is a chronic problem. The current episode started more than 1 week ago. The problem occurs rarely. The problem is associated with nothing. Pertinent negatives include no diaphoresis, no fever, no numbness, no chest pain, no claudication, no near-syncope, no orthopnea, no PND, no abdominal pain, no nausea, no vomiting, no headaches, no leg pain, no lower extremity edema, no dizziness, no weakness, no cough, no hemoptysis, no shortness of breath and no sputum production. Risk factors include no risk factors. Her past medical history is significant for SVT. Her past medical history does not include hyperthyroidism, valve disorder, anemia, heart disease, DM, past MI, hypertension or atrial fibrillation. Review of Systems   Constitutional: Negative for chills, diaphoresis, fever and weight loss. HENT: Negative for ear pain and hearing loss. Eyes: Negative for blurred vision. Respiratory: Negative for cough, hemoptysis, sputum production, shortness of breath, wheezing and stridor. Cardiovascular: Positive for palpitations. Negative for chest pain, orthopnea, claudication, leg swelling, PND and near-syncope. Gastrointestinal: Negative for abdominal pain, heartburn, nausea and vomiting. Musculoskeletal: Negative for myalgias and neck pain. Skin: Negative for rash. Neurological: Negative for dizziness, tingling, tremors, focal weakness, loss of consciousness, weakness, numbness and headaches. Psychiatric/Behavioral: Negative for depression and suicidal ideas.      Family History   Problem Relation Age of Onset    Emphysema Mother     Cancer Neg Hx     Diabetes Neg Hx     Heart Disease Neg Hx     Hypertension Neg Hx     Stroke Neg Hx        Past Medical History:   Diagnosis Date    Anemia     Hypertension     pregnancy induced HTN    Mass of heart     Spon  w/o complication     SVT (supraventricular tachycardia) (HCC)     SVT (supraventricular tachycardia) (HCC)        Past Surgical History:   Procedure Laterality Date    HX GYN      left ovarian cyst removed    HX SVT ABLATION      HX TONSIL AND ADENOIDECTOMY      HX TONSILLECTOMY  no date noted in HX    tonsils, adnoids & tues       Social History     Tobacco Use    Smoking status: Never Smoker    Smokeless tobacco: Never Used   Substance Use Topics    Alcohol use: Yes     Alcohol/week: 2.0 standard drinks     Types: 2 Shots of liquor per week     Frequency: 2-4 times a month     Drinks per session: 1 or 2     Binge frequency: Never       No Known Allergies    Outpatient Medications Marked as Taking for the 19 encounter (Office Visit) with Lesly Kirby MD   Medication Sig Dispense Refill    escitalopram oxalate (LEXAPRO) 20 mg tablet Take 1 Tab by mouth daily. 30 Tab 1    gabapentin (NEURONTIN) 300 mg capsule Take 1 cap po q hs x 1 week then increase to 2 caps po q hs 60 Cap 1    metoprolol tartrate (LOPRESSOR) 25 mg tablet Take 0.5 Tabs by mouth two (2) times a day. 30 Tab 6    etonogestrel (NEXPLANON) 68 mg impl by SubDERmal route. Visit Vitals  /67   Pulse (!) 55   Ht 5' 6\" (1.676 m)   Wt 127.6 kg (281 lb 3.2 oz)   BMI 45.39 kg/m²     Physical Exam   Constitutional: She is oriented to person, place, and time. She appears well-developed and well-nourished. No distress. HENT:   Head: Normocephalic and atraumatic. Mouth/Throat: No oropharyngeal exudate. Eyes: Conjunctivae and EOM are normal. No scleral icterus. Neck: Normal range of motion. Neck supple. No JVD present. Cardiovascular: Normal rate and regular rhythm. Exam reveals no gallop. No murmur heard. Pulmonary/Chest: Effort normal and breath sounds normal. No stridor. She has no wheezes. She has no rales. Abdominal: Soft. Musculoskeletal: Normal range of motion. She exhibits no edema. Neurological: She is alert and oriented to person, place, and time. She exhibits normal muscle tone. Skin: Skin is warm. She is not diaphoretic. Psychiatric: She has a normal mood and affect. Her behavior is normal.     ekg sinus rhythm with no acute st-t changes  ASSESSMENT and PLAN    ICD-10-CM ICD-9-CM    1. SVT (supraventricular tachycardia) ([facility]) I47.1 427.89 AMB POC EKG ROUTINE W/ 12 LEADS, INTER & REP   2. Obesity, morbid ([address] E66.01 278.01    3. Palpitations R00.2 785.1      Orders Placed This Encounter    AMB POC EKG ROUTINE W/ 12 LEADS, INTER & REP     Order Specific Question:   Reason for Exam:     Answer:   SVT     Follow-up and Dispositions    · Return in about 1 year (around 7/23/2020). current treatment plan is effective, no change in therapy. Patient with history of wide-complex tachycardia -subsequently underwent EP study - unable to induce arrhythmia. Normal LV function. Cardiac MR- no structural heart disease. On lopressor 12.5mg daily-seen for follow-up prior to starting on weight reduction medications. No recent palpitations or syncope/near syncope. If started on medication, needs close follow-up. RTC in  3 weeks.

## 2019-07-23 NOTE — PROGRESS NOTES
1. Have you been to the ER, urgent care clinic since your last visit? Hospitalized since your last visit? Yes When: 12/03/19 Where: [facility] Reason for visit: left ankle pain     2. Have you seen or consulted any other health care providers outside of the [address] [place] [address] since your last visit? Include any pap smears or colon screening.       No

## 2019-10-10 ENCOUNTER — OFFICE VISIT (OUTPATIENT)
Dept: FAMILY MEDICINE CLINIC | Facility: CLINIC | Age: 29
End: 2019-10-10

## 2019-10-10 VITALS
BODY MASS INDEX: 45.32 KG/M2 | TEMPERATURE: 97.2 F | HEART RATE: 75 BPM | DIASTOLIC BLOOD PRESSURE: 77 MMHG | SYSTOLIC BLOOD PRESSURE: 127 MMHG | HEIGHT: 66 IN | OXYGEN SATURATION: 97 % | WEIGHT: 282 LBS | RESPIRATION RATE: 12 BRPM

## 2019-10-10 DIAGNOSIS — E66.01 OBESITY, MORBID (HCC): Primary | ICD-10-CM

## 2019-10-10 DIAGNOSIS — Z91.018 ALLERGY TO FOOD: ICD-10-CM

## 2019-10-10 DIAGNOSIS — Z23 ENCOUNTER FOR IMMUNIZATION: ICD-10-CM

## 2019-10-10 DIAGNOSIS — R00.2 PALPITATIONS: ICD-10-CM

## 2019-10-10 DIAGNOSIS — F32.A DEPRESSION, UNSPECIFIED DEPRESSION TYPE: ICD-10-CM

## 2019-10-10 RX ORDER — ESCITALOPRAM OXALATE 20 MG/1
20 TABLET ORAL DAILY
Qty: 90 TAB | Refills: 1 | Status: SHIPPED | OUTPATIENT
Start: 2019-10-10 | End: 2020-02-12 | Stop reason: SDUPTHER

## 2019-10-10 RX ORDER — PHENTERMINE HYDROCHLORIDE 37.5 MG/1
TABLET ORAL
Qty: 14 TAB | Refills: 0 | Status: SHIPPED | OUTPATIENT
Start: 2019-10-10 | End: 2020-09-23

## 2019-10-10 NOTE — PROGRESS NOTES
Visit Vitals  /77   Pulse 75   Temp 97.2 °F (36.2 °C) (Oral)   Resp 12   Ht 5' 6\" (1.676 m)   Wt 282 lb (127.9 kg)   LMP 10/02/2019   SpO2 97%   BMI 45.52 kg/m²     Milton Moralez presents today for   Chief Complaint   Patient presents with    Medication Evaluation     weight loss       Is someone accompanying this pt? no    Is the patient using any DME equipment during [address]? no    Depression Screening:  3 most recent PHQ Screens 4/23/2019   Little interest or pleasure in doing things Several days   Feeling down, depressed, irritable, or hopeless Nearly every day   Total Score PHQ 2 4   Trouble falling or staying asleep, or sleeping too much More than half the days   Feeling tired or having little energy More than half the days   Poor appetite, weight loss, or overeating Several days   Feeling bad about yourself - or that you are a failure or have let yourself or your family down Nearly every day   Trouble concentrating on things such as school, work, reading, or watching TV Not at all   Moving or speaking so slowly that other people could have noticed; or the opposite being so fidgety that others notice Not at all   Thoughts of being better off dead, or hurting yourself in some way Not at all   PHQ 9 Score 12   How difficult have these problems made it for you to do your work, take care of your home and get along with others Very difficult       Learning Assessment:  No flowsheet data found. Abuse Screening:  Abuse Screening Questionnaire 4/23/2019   Do you ever feel afraid of your partner? N   Are you in a relationship with someone who physically or mentally threatens you? N   Is it safe for you to go home? Y       Fall Risk  No flowsheet data found. Health Maintenance reviewed and discussed and ordered per Provider.     Health Maintenance Due   Topic Date Due    DTaP/Tdap/Td series (1 - Tdap) 11/17/2011    PAP AKA CERVICAL CYTOLOGY  11/17/2011    Influenza Age 9 to Adult  08/01/2019 Coordination of Care:  1. Have you been to the ER, urgent care clinic since your last visit? Hospitalized since your last visit? no    2. Have you seen or consulted any other health care providers outside of the [address] [place] [address] since your last visit? Include any pap smears or colon screening.  no

## 2019-10-22 NOTE — PROGRESS NOTES
Fanny Augustine is a 29 y.o. female presenting today for Medication Evaluation (weight loss)  . HPI:  Fanny Augustine presents to the office today for obesity. She presents requesting a weight loss medication. Patient has a history of SVT and was instructed to see her cardiologist prior to starting any medication. She was seen by her cardiologist on 2019 and per his office note she has had no recent palpitation or syncopal or near syncopal episodes. He is requesting that she is followed-up closely if she is started on the medication. Review of Systems   Respiratory: Negative for cough. Cardiovascular: Negative for chest pain and palpitations (Palpitation history but no recent episodes). Neurological: Negative for dizziness and headaches. Psychiatric/Behavioral: Positive for depression. Negative for suicidal ideas. No Known Allergies    Current Outpatient Medications   Medication Sig Dispense Refill    phentermine (ADIPEX-P) 37.5 mg tablet Take 1/2 tablet x 1 week then 1 tablet x 1 week 14 Tab 0    escitalopram oxalate (LEXAPRO) 20 mg tablet Take 1 Tab by mouth daily. 90 Tab 1    gabapentin (NEURONTIN) 300 mg capsule Take 1 cap po q hs x 1 week then increase to 2 caps po q hs 60 Cap 1    metoprolol tartrate (LOPRESSOR) 25 mg tablet Take 0.5 Tabs by mouth two (2) times a day. 30 Tab 6    etonogestrel (NEXPLANON) 68 mg impl by SubDERmal route.  meloxicam (MOBIC) 15 mg tablet Take 1 Tab by mouth daily.  10 Tab 0       Past Medical History:   Diagnosis Date    Anemia     Hypertension     pregnancy induced HTN    Mass of heart     Spon  w/o complication     SVT (supraventricular tachycardia) (HCC)     SVT (supraventricular tachycardia) (HCC)        Past Surgical History:   Procedure Laterality Date    HX GYN      left ovarian cyst removed    HX SVT ABLATION      HX TONSIL AND ADENOIDECTOMY      HX TONSILLECTOMY  no date noted in HX    tonsils, yvrose & tues       Social History     Socioeconomic History    Marital status:      Spouse name: Not on file    Number of children: Not on file    Years of education: Not on file    Highest education level: Not on file   Occupational History    Not on file   Social Needs    Financial resource strain: Not on file    Food insecurity:     Worry: Not on file     Inability: Not on file    Transportation needs:     Medical: Not on file     Non-medical: Not on file   Tobacco Use    Smoking status: Never Smoker    Smokeless tobacco: Never Used   Substance and Sexual Activity    Alcohol use:  Yes     Alcohol/week: 2.0 standard drinks     Types: 2 Shots of liquor per week     Frequency: 2-4 times a month     Drinks per session: 1 or 2     Binge frequency: Never    Drug use: No    Sexual activity: Yes     Partners: Male     Birth control/protection: None   Lifestyle    Physical activity:     Days per week: Not on file     Minutes per session: Not on file    Stress: Not on file   Relationships    Social connections:     Talks on phone: Not on file     Gets together: Not on file     Attends Shinto service: Not on file     Active member of club or organization: Not on file     Attends meetings of clubs or organizations: Not on file     Relationship status: Not on file    Intimate partner violence:     Fear of current or ex partner: Not on file     Emotionally abused: Not on file     Physically abused: Not on file     Forced sexual activity: Not on file   Other Topics Concern    Not on file   Social History Narrative    ** Merged History Encounter **            Patient does not have an advanced directive on file    Vitals:    10/10/19 0942   BP: 127/77   Pulse: 75   Resp: 12   Temp: 97.2 °F (36.2 °C)   TempSrc: Oral   SpO2: 97%   Weight: 282 lb (127.9 kg)   Height: 5' 6\" (1.676 m)   PainSc:   0 - No pain   LMP: 10/02/2019       Physical Exam   Cardiovascular: Normal rate, regular rhythm and normal heart sounds. Pulmonary/Chest: Effort normal and breath sounds normal.   Neurological: She is alert. Psychiatric: Affect and judgment normal.   Nursing note and vitals reviewed. No visits with results within 3 Month(s) from this visit. Latest known visit with results is:   Hospital Outpatient Visit on 04/23/2019   Component Date Value Ref Range Status    WBC 04/23/2019 8.0  4.6 - 13.2 K/uL Final    RBC 04/23/2019 4.96  4.20 - 5.30 M/uL Final    HGB 04/23/2019 14.3  12.0 - 16.0 g/dL Final    HCT 04/23/2019 43.9  35.0 - 45.0 % Final    MCV 04/23/2019 88.5  74.0 - 97.0 FL Final    MCH 04/23/2019 28.8  24.0 - 34.0 PG Final    MCHC 04/23/2019 32.6  31.0 - 37.0 g/dL Final    RDW 04/23/2019 13.0  11.6 - 14.5 % Final    PLATELET 16/67/5120 434  135 - 420 K/uL Final    MPV 04/23/2019 10.7  9.2 - 11.8 FL Final    NEUTROPHILS 04/23/2019 59  40 - 73 % Final    LYMPHOCYTES 04/23/2019 31  21 - 52 % Final    MONOCYTES 04/23/2019 6  3 - 10 % Final    EOSINOPHILS 04/23/2019 3  0 - 5 % Final    BASOPHILS 04/23/2019 1  0 - 2 % Final    ABS. NEUTROPHILS 04/23/2019 4.8  1.8 - 8.0 K/UL Final    ABS. LYMPHOCYTES 04/23/2019 2.5  0.9 - 3.6 K/UL Final    ABS. MONOCYTES 04/23/2019 0.5  0.05 - 1.2 K/UL Final    ABS. EOSINOPHILS 04/23/2019 0.3  0.0 - 0.4 K/UL Final    ABS. BASOPHILS 04/23/2019 0.1  0.0 - 0.1 K/UL Final    DF 04/23/2019 AUTOMATED    Final    LIPID PROFILE 04/23/2019        Final    Cholesterol, total 04/23/2019 226* <200 MG/DL Final    Triglyceride 04/23/2019 276* <150 MG/DL Final    Comment: The drugs N-acetylcysteine (NAC) and  Metamiszole have been found to cause falsely  low results in this chemical assay. Please  be sure to submit blood samples obtained  BEFORE administration of either of these  drugs to assure correct results.       HDL Cholesterol 04/23/2019 39* 40 - 60 MG/DL Final    LDL, calculated 04/23/2019 131.8* 0 - 100 MG/DL Final    VLDL, calculated 04/23/2019 55.2  MG/DL Final  CHOL/HDL Ratio 04/23/2019 5.8* 0 - 5.0   Final    Sodium 04/23/2019 139  136 - 145 mmol/L Final    Potassium 04/23/2019 4.3  3.5 - 5.5 mmol/L Final    Chloride 04/23/2019 108  100 - 108 mmol/L Final    CO2 04/23/2019 24  21 - 32 mmol/L Final    Anion gap 04/23/2019 7  3.0 - 18 mmol/L Final    Glucose 04/23/2019 89  74 - 99 mg/dL Final    BUN 04/23/2019 10  7.0 - 18 MG/DL Final    Creatinine 04/23/2019 0.91  0.6 - 1.3 MG/DL Final    BUN/Creatinine ratio 04/23/2019 11* 12 - 20   Final    GFR est AA 04/23/2019 >60  >60 ml/min/1.73m2 Final    GFR est non-AA 04/23/2019 >60  >60 ml/min/1.73m2 Final    Comment: (NOTE)  Estimated GFR is calculated using the Modification of Diet in Renal   Disease (MDRD) Study equation, reported for both  Americans   (GFRAA) and non- Americans (GFRNA), and normalized to 1.73m2   body surface area. The physician must decide which value applies to   the patient. The MDRD study equation should only be used in   individuals age 25 or older. It has not been validated for the   following: pregnant women, patients with serious comorbid conditions,   or on certain medications, or persons with extremes of body size,   muscle mass, or nutritional status.  Calcium 04/23/2019 9.3  8.5 - 10.1 MG/DL Final    Bilirubin, total 04/23/2019 0.5  0.2 - 1.0 MG/DL Final    ALT (SGPT) 04/23/2019 27  13 - 56 U/L Final    AST (SGOT) 04/23/2019 14* 15 - 37 U/L Final    Alk. phosphatase 04/23/2019 71  45 - 117 U/L Final    Protein, total 04/23/2019 7.3  6.4 - 8.2 g/dL Final    Albumin 04/23/2019 3.9  3.4 - 5.0 g/dL Final    Globulin 04/23/2019 3.4  2.0 - 4.0 g/dL Final    A-G Ratio 04/23/2019 1.1  0.8 - 1.7   Final       .No results found for any visits on 10/10/19. Assessment / Plan:      ICD-10-CM ICD-9-CM    1. Obesity, morbid ([facility] [address] E66.01 278.01 phentermine (ADIPEX-P) 37.5 mg tablet   2.  Depression, unspecified depression type F32.9 311 escitalopram oxalate (LEXAPRO) 20 mg tablet   3. Allergy to food Z91.018 V15.05 REFERRAL TO ALLERGY   4. Palpitations R00.2 785.1    5. Encounter for immunization Z23 V03.89 INFLUENZA VIRUS VAC QUAD,SPLIT,PRESV FREE SYRINGE IM     Phentermine prescription given. Instructed to take half  dose x1 week and then if no palpitations okay to increase to a full dose  History of depression-refill Lexapro prescription  Patient requests referral to allergist for testing  Influenza vaccine given      Follow-up and Dispositions    · Return in about 4 weeks (around 11/7/2019), or if symptoms worsen or fail to improve. I asked the patient if she  had any questions and answered her  questions. The patient stated that she understands the treatment plan and agrees with the treatment plan    This document was created with a voice activated dictation system and may contain transcription errors.

## 2019-12-12 ENCOUNTER — CLINICAL SUPPORT (OUTPATIENT)
Dept: FAMILY MEDICINE CLINIC | Facility: CLINIC | Age: 29
End: 2019-12-12

## 2019-12-12 DIAGNOSIS — Z11.1 SCREENING EXAMINATION FOR PULMONARY TUBERCULOSIS: ICD-10-CM

## 2019-12-12 DIAGNOSIS — Z23 ENCOUNTER FOR IMMUNIZATION: ICD-10-CM

## 2019-12-16 ENCOUNTER — CLINICAL SUPPORT (OUTPATIENT)
Dept: FAMILY MEDICINE CLINIC | Facility: CLINIC | Age: 29
End: 2019-12-16

## 2019-12-16 DIAGNOSIS — Z23 ENCOUNTER FOR IMMUNIZATION: ICD-10-CM

## 2019-12-16 DIAGNOSIS — Z11.1 SCREENING EXAMINATION FOR PULMONARY TUBERCULOSIS: ICD-10-CM

## 2020-02-12 ENCOUNTER — OFFICE VISIT (OUTPATIENT)
Dept: FAMILY MEDICINE CLINIC | Facility: CLINIC | Age: 30
End: 2020-02-12

## 2020-02-12 VITALS
BODY MASS INDEX: 43.07 KG/M2 | HEART RATE: 86 BPM | SYSTOLIC BLOOD PRESSURE: 135 MMHG | WEIGHT: 268 LBS | DIASTOLIC BLOOD PRESSURE: 93 MMHG | OXYGEN SATURATION: 98 % | HEIGHT: 66 IN | TEMPERATURE: 98 F | RESPIRATION RATE: 12 BRPM

## 2020-02-12 DIAGNOSIS — F32.A DEPRESSION, UNSPECIFIED DEPRESSION TYPE: ICD-10-CM

## 2020-02-12 DIAGNOSIS — H66.90 ACUTE OTITIS MEDIA, UNSPECIFIED OTITIS MEDIA TYPE: ICD-10-CM

## 2020-02-12 DIAGNOSIS — M79.604 RIGHT LEG PAIN: Primary | ICD-10-CM

## 2020-02-12 RX ORDER — AMOXICILLIN AND CLAVULANATE POTASSIUM 875; 125 MG/1; MG/1
1 TABLET, FILM COATED ORAL EVERY 12 HOURS
Qty: 20 TAB | Refills: 0 | Status: SHIPPED | OUTPATIENT
Start: 2020-02-12 | End: 2020-02-22

## 2020-02-12 RX ORDER — ESCITALOPRAM OXALATE 20 MG/1
20 TABLET ORAL DAILY
Qty: 90 TAB | Refills: 1 | Status: SHIPPED | OUTPATIENT
Start: 2020-02-12 | End: 2021-02-05 | Stop reason: SDUPTHER

## 2020-02-12 NOTE — PROGRESS NOTES
Visit Vitals  BP (!) 135/93   Pulse 86   Temp 98 °F (36.7 °C) (Oral)   Resp 12   Ht 5' 6\" (1.676 m)   Wt 268 lb (121.6 kg)   LMP 01/13/2020   SpO2 98%   BMI 43.26 kg/m²     Aayush Duncan presents today for   Chief Complaint   Patient presents with    Medication Evaluation    Knee Swelling     right knee pain and swelling for a few months       Is someone accompanying this pt? no    Is the patient using any DME equipment during OV? no    Depression Screening:  3 most recent PHQ Screens 4/23/2019   Little interest or pleasure in doing things Several days   Feeling down, depressed, irritable, or hopeless Nearly every day   Total Score PHQ 2 4   Trouble falling or staying asleep, or sleeping too much More than half the days   Feeling tired or having little energy More than half the days   Poor appetite, weight loss, or overeating Several days   Feeling bad about yourself - or that you are a failure or have let yourself or your family down Nearly every day   Trouble concentrating on things such as school, work, reading, or watching TV Not at all   Moving or speaking so slowly that other people could have noticed; or the opposite being so fidgety that others notice Not at all   Thoughts of being better off dead, or hurting yourself in some way Not at all   PHQ 9 Score 12   How difficult have these problems made it for you to do your work, take care of your home and get along with others Very difficult       Learning Assessment:  No flowsheet data found. Abuse Screening:  Abuse Screening Questionnaire 4/23/2019   Do you ever feel afraid of your partner? N   Are you in a relationship with someone who physically or mentally threatens you? N   Is it safe for you to go home? Y       Fall Risk  No flowsheet data found. Health Maintenance reviewed and discussed and ordered per Provider.     Health Maintenance Due   Topic Date Due    DTaP/Tdap/Td series (1 - Tdap) 11/17/2001    PAP AKA CERVICAL CYTOLOGY  11/17/2011 Coordination of Care:  1. Have you been to the ER, urgent care clinic since your last visit? Hospitalized since your last visit? yes    2. Have you seen or consulted any other health care providers outside of the [address] [place] [address] since your last visit? Include any pap smears or colon screening.  no

## 2020-02-12 NOTE — PROGRESS NOTES
Dashawn Bosch is a 34 y.o. female presenting today for Medication Evaluation and Knee Swelling (right knee pain and swelling for a few months)  . Medication Evaluation   Pertinent negatives include no chest pain and no headaches. Knee Swelling      :  Dashawn Bosch presents to the office today to establish care with the practice. She has a past medical history for palpitations, depression, obesity and supraventricular tachycardia. Jovany Urbina HTN- patient blood pressure is mildly uncontrolled but she is non-compliant with taking her medication daily and often missed are second dose of metoprolol. She denies any chest pain or palpation. Her blood pressure is 135/93. She also has a history of depression-she currently takes Lexapro 20 mg daily which controlled her depression. She denies any suicidal or homicidal ideations. Otalgia-patient is complaining about right otalgia notes that she has had mild drainage from the right ear canal.  She denies any fever or chills. Review of Systems   Respiratory: Negative for cough and hemoptysis. Cardiovascular: Negative for chest pain, palpitations and leg swelling. Neurological: Negative for dizziness and headaches. Psychiatric/Behavioral: Positive for depression. No Known Allergies    Current Outpatient Medications   Medication Sig Dispense Refill    escitalopram oxalate (LEXAPRO) 20 mg tablet Take 1 Tab by mouth daily. 90 Tab 1    metoprolol tartrate (LOPRESSOR) 25 mg tablet Take 0.5 Tabs by mouth two (2) times a day. 30 Tab 6    phentermine (ADIPEX-P) 37.5 mg tablet Take 1/2 tablet x 1 week then 1 tablet x 1 week 14 Tab 0    meloxicam (MOBIC) 15 mg tablet Take 1 Tab by mouth daily. 10 Tab 0    gabapentin (NEURONTIN) 300 mg capsule Take 1 cap po q hs x 1 week then increase to 2 caps po q hs 60 Cap 1    etonogestrel (NEXPLANON) 68 mg impl by SubDERmal route.          Past Medical History:   Diagnosis Date    Anemia     Hypertension     pregnancy induced HTN    Mass of heart     Spon  w/o complication     SVT (supraventricular tachycardia) (HCC)     SVT (supraventricular tachycardia) (HCC)        Past Surgical History:   Procedure Laterality Date    HX GYN      left ovarian cyst removed    HX SVT ABLATION      HX TONSIL AND ADENOIDECTOMY      HX TONSILLECTOMY  no date noted in HX    tonsils, adnoids & tues       Social History     Socioeconomic History    Marital status:      Spouse name: Not on file    Number of children: Not on file    Years of education: Not on file    Highest education level: Not on file   Occupational History    Not on file   Social Needs    Financial resource strain: Not on file    Food insecurity:     Worry: Not on file     Inability: Not on file    Transportation needs:     Medical: Not on file     Non-medical: Not on file   Tobacco Use    Smoking status: Never Smoker    Smokeless tobacco: Never Used   Substance and Sexual Activity    Alcohol use:  Yes     Alcohol/week: 2.0 standard drinks     Types: 2 Shots of liquor per week     Frequency: 2-4 times a month     Drinks per session: 1 or 2     Binge frequency: Never    Drug use: No    Sexual activity: Yes     Partners: Male     Birth control/protection: None   Lifestyle    Physical activity:     Days per week: Not on file     Minutes per session: Not on file    Stress: Not on file   Relationships    Social connections:     Talks on phone: Not on file     Gets together: Not on file     Attends Religion service: Not on file     Active member of club or organization: Not on file     Attends meetings of clubs or organizations: Not on file     Relationship status: Not on file    Intimate partner violence:     Fear of current or ex partner: Not on file     Emotionally abused: Not on file     Physically abused: Not on file     Forced sexual activity: Not on file   Other Topics Concern    Not on file   Social History Narrative    ** Merged History Encounter **            Patient does not have an advanced directive on file    Vitals:    02/12/20 1635   BP: (!) 135/93   Pulse: 86   Resp: 12   Temp: 98 °F (36.7 °C)   TempSrc: Oral   SpO2: 98%   Weight: 268 lb (121.6 kg)   Height: 5' 6\" (1.676 m)   PainSc:   5   LMP: 01/13/2020       Physical Exam   HENT:   Right Ear: Tympanic membrane is erythematous. Cardiovascular: Normal rate, regular rhythm and normal heart sounds. Pulmonary/Chest: Effort normal and breath sounds normal.   Psychiatric: She is not agitated. Nursing note and vitals reviewed. No visits with results within 3 Month(s) from this visit. Latest known visit with results is:   Hospital Outpatient Visit on 04/23/2019   Component Date Value Ref Range Status    WBC 04/23/2019 8.0  4.6 - 13.2 K/uL Final    RBC 04/23/2019 4.96  4.20 - 5.30 M/uL Final    HGB 04/23/2019 14.3  12.0 - 16.0 g/dL Final    HCT 04/23/2019 43.9  35.0 - 45.0 % Final    MCV 04/23/2019 88.5  74.0 - 97.0 FL Final    MCH 04/23/2019 28.8  24.0 - 34.0 PG Final    MCHC 04/23/2019 32.6  31.0 - 37.0 g/dL Final    RDW 04/23/2019 13.0  11.6 - 14.5 % Final    PLATELET 84/08/7768 594  135 - 420 K/uL Final    MPV 04/23/2019 10.7  9.2 - 11.8 FL Final    NEUTROPHILS 04/23/2019 59  40 - 73 % Final    LYMPHOCYTES 04/23/2019 31  21 - 52 % Final    MONOCYTES 04/23/2019 6  3 - 10 % Final    EOSINOPHILS 04/23/2019 3  0 - 5 % Final    BASOPHILS 04/23/2019 1  0 - 2 % Final    ABS. NEUTROPHILS 04/23/2019 4.8  1.8 - 8.0 K/UL Final    ABS. LYMPHOCYTES 04/23/2019 2.5  0.9 - 3.6 K/UL Final    ABS. MONOCYTES 04/23/2019 0.5  0.05 - 1.2 K/UL Final    ABS. EOSINOPHILS 04/23/2019 0.3  0.0 - 0.4 K/UL Final    ABS.  BASOPHILS 04/23/2019 0.1  0.0 - 0.1 K/UL Final    DF 04/23/2019 AUTOMATED    Final    LIPID PROFILE 04/23/2019        Final    Cholesterol, total 04/23/2019 226* <200 MG/DL Final    Triglyceride 04/23/2019 276* <150 MG/DL Final    Comment: The drugs N-acetylcysteine (NAC) and  Metamiszole have been found to cause falsely  low results in this chemical assay. Please  be sure to submit blood samples obtained  BEFORE administration of either of these  drugs to assure correct results.  HDL Cholesterol 04/23/2019 39* 40 - 60 MG/DL Final    LDL, calculated 04/23/2019 131.8* 0 - 100 MG/DL Final    VLDL, calculated 04/23/2019 55.2  MG/DL Final    CHOL/HDL Ratio 04/23/2019 5.8* 0 - 5.0   Final    Sodium 04/23/2019 139  136 - 145 mmol/L Final    Potassium 04/23/2019 4.3  3.5 - 5.5 mmol/L Final    Chloride 04/23/2019 108  100 - 108 mmol/L Final    CO2 04/23/2019 24  21 - 32 mmol/L Final    Anion gap 04/23/2019 7  3.0 - 18 mmol/L Final    Glucose 04/23/2019 89  74 - 99 mg/dL Final    BUN 04/23/2019 10  7.0 - 18 MG/DL Final    Creatinine 04/23/2019 0.91  0.6 - 1.3 MG/DL Final    BUN/Creatinine ratio 04/23/2019 11* 12 - 20   Final    GFR est AA 04/23/2019 >60  >60 ml/min/1.73m2 Final    GFR est non-AA 04/23/2019 >60  >60 ml/min/1.73m2 Final    Comment: (NOTE)  Estimated GFR is calculated using the Modification of Diet in Renal   Disease (MDRD) Study equation, reported for both  Americans   (GFRAA) and non- Americans (GFRNA), and normalized to 1.73m2   body surface area. The physician must decide which value applies to   the patient. The MDRD study equation should only be used in   individuals age 25 or older. It has not been validated for the   following: pregnant women, patients with serious comorbid conditions,   or on certain medications, or persons with extremes of body size,   muscle mass, or nutritional status.  Calcium 04/23/2019 9.3  8.5 - 10.1 MG/DL Final    Bilirubin, total 04/23/2019 0.5  0.2 - 1.0 MG/DL Final    ALT (SGPT) 04/23/2019 27  13 - 56 U/L Final    AST (SGOT) 04/23/2019 14* 15 - 37 U/L Final    Alk.  phosphatase 04/23/2019 71  45 - 117 U/L Final    Protein, total 04/23/2019 7.3  6.4 - 8.2 g/dL Final    Albumin 04/23/2019 3.9  3.4 - 5.0 g/dL Final    Globulin 04/23/2019 3.4  2.0 - 4.0 g/dL Final    A-G Ratio 04/23/2019 1.1  0.8 - 1.7   Final       .  No results found for any visits on 02/12/20. Assessment / Plan:      ICD-10-CM ICD-9-CM    1. Right leg pain M79.604 729.5 REFERRAL TO ORTHOPEDICS   2. Depression, unspecified depression type F32.9 311 escitalopram oxalate (LEXAPRO) 20 mg tablet   3. Acute otitis media, unspecified otitis media type H66.90 382.9 amoxicillin-clavulanate (AUGMENTIN) 875-125 mg per tablet     Depression- Lexapro rx  Fasting labs   Metoprolol rx refilled  Acute otitis media-Augmentin prescription given  F/u prn      Follow-up and Dispositions    · Return if symptoms worsen or fail to improve. I asked the patient if she  had any questions and answered her  questions.   The patient stated that she understands the treatment plan and agrees with the treatment plan

## 2020-03-30 ENCOUNTER — OFFICE VISIT (OUTPATIENT)
Dept: FAMILY MEDICINE CLINIC | Age: 30
End: 2020-03-30

## 2020-03-30 VITALS
TEMPERATURE: 97.6 F | BODY MASS INDEX: 43.71 KG/M2 | HEIGHT: 66 IN | DIASTOLIC BLOOD PRESSURE: 72 MMHG | RESPIRATION RATE: 20 BRPM | WEIGHT: 272 LBS | SYSTOLIC BLOOD PRESSURE: 112 MMHG | HEART RATE: 88 BPM | OXYGEN SATURATION: 97 %

## 2020-03-30 DIAGNOSIS — H10.31 ACUTE CONJUNCTIVITIS OF RIGHT EYE, UNSPECIFIED ACUTE CONJUNCTIVITIS TYPE: Primary | ICD-10-CM

## 2020-03-30 DIAGNOSIS — Z33.1 INCIDENTAL PREGNANCY: ICD-10-CM

## 2020-03-30 DIAGNOSIS — Z72.51 UNPROTECTED SEX: ICD-10-CM

## 2020-03-30 DIAGNOSIS — R11.0 NAUSEA: ICD-10-CM

## 2020-03-30 LAB
HCG QL BLOOD POCT, HCGQLPOCT: NORMAL
HCG URINE, QL. (POC): POSITIVE
VALID INTERNAL CONTROL?: YES

## 2020-03-30 RX ORDER — ONDANSETRON 4 MG/1
4 TABLET, ORALLY DISINTEGRATING ORAL
Qty: 12 TAB | Refills: 0 | Status: SHIPPED | OUTPATIENT
Start: 2020-03-30 | End: 2022-05-15

## 2020-03-30 RX ORDER — POLYMYXIN B SULFATE AND TRIMETHOPRIM 1; 10000 MG/ML; [USP'U]/ML
1 SOLUTION OPHTHALMIC EVERY 6 HOURS
Qty: 1 BOTTLE | Refills: 0 | Status: SHIPPED | OUTPATIENT
Start: 2020-03-30 | End: 2020-04-06

## 2020-03-30 NOTE — PROGRESS NOTES
Dianne Byrd presents today for   Chief Complaint   Patient presents with    Red Eye     c/o red conjuctiva and crusting in the morning and drainage. States gritty feeling also. Is someone accompanying this pt? No    Is the patient using any DME equipment during OV? No    Depression Screening:  3 most recent PHQ Screens 3/30/2020   Little interest or pleasure in doing things Not at all   Feeling down, depressed, irritable, or hopeless Not at all   Total Score PHQ 2 0   Trouble falling or staying asleep, or sleeping too much -   Feeling tired or having little energy -   Poor appetite, weight loss, or overeating -   Feeling bad about yourself - or that you are a failure or have let yourself or your family down -   Trouble concentrating on things such as school, work, reading, or watching TV -   Moving or speaking so slowly that other people could have noticed; or the opposite being so fidgety that others notice -   Thoughts of being better off dead, or hurting yourself in some way -   PHQ 9 Score -   How difficult have these problems made it for you to do your work, take care of your home and get along with others -       Learning Assessment:  Learning Assessment 3/30/2020   PRIMARY LEARNER Patient   HIGHEST LEVEL OF EDUCATION - PRIMARY LEARNER  SOME COLLEGE   BARRIERS PRIMARY LEARNER NONE   CO-LEARNER CAREGIVER No   PRIMARY LANGUAGE ENGLISH   LEARNER PREFERENCE PRIMARY DEMONSTRATION   ANSWERED BY Patient   RELATIONSHIP SELF       Abuse Screening:  Abuse Screening Questionnaire 3/30/2020   Do you ever feel afraid of your partner? N   Are you in a relationship with someone who physically or mentally threatens you? N   Is it safe for you to go home? Y         Health Maintenance Due   Topic Date Due    DTaP/Tdap/Td series (1 - Tdap) 11/17/2011    PAP AKA CERVICAL CYTOLOGY  11/17/2011   . Health Maintenance reviewed and discussed and ordered per Provider. Coordination of Care  1.  Have you been to the ER, urgent care clinic since your last visit? Hospitalized since your last visit? No    2. Have you seen or consulted any other health care providers outside of the [address] [place] [address] since your last visit? Include any pap smears or colon screening. Advance Directive:  1. Do you have an advance directive in place? Patient Reply:No    2. If not, would you like material regarding how to put one in place?  Patient Reply: No

## 2020-03-30 NOTE — PROGRESS NOTES
HPI  Pt of TIN Browne. She presents with 2 day history of right eye crusting in AM, tender, clear drainage, no photophobia, swollen lower lid. Denies vision changes. Denies getting anything in to eye. Also recently found out that she is pregnant. Has been experiencing nausea. Asking if she can get some medication to help her deal with the symptoms. Said that in previous pregnancies, she dealt with severe nausea and vomiting and is concerned about this. Has not scheduled appointment with her ob/ gyn yet. Said that she stopped taking all of her medications once she found out that she was pregnant. Past Medical History  Past Medical History:   Diagnosis Date    Anemia     Hypertension     pregnancy induced HTN    Mass of heart     Spon  w/o complication 2454    [facility]     SVT (supraventricular tachycardia) ([facility] [address]        Surgical History  Past Surgical History:   Procedure Laterality Date    HX GYN      left ovarian cyst removed    HX SVT ABLATION      HX TONSIL AND ADENOIDECTOMY      HX TONSILLECTOMY  no date noted in HX    tonsils, adnoids & tues        Medications  Current Outpatient Medications   Medication Sig Dispense Refill    ondansetron (ZOFRAN ODT) 4 mg disintegrating tablet Take 1 Tab by mouth every eight (8) hours as needed for Nausea, Vomiting or Nausea or Vomiting. 12 Tab 0    trimethoprim-polymyxin b (POLYTRIM) ophthalmic solution Administer 1 Drop to right eye every six (6) hours for 7 days. 1 Bottle 0    escitalopram oxalate (LEXAPRO) 20 mg tablet Take 1 Tab by mouth daily. 90 Tab 1    phentermine (ADIPEX-P) 37.5 mg tablet Take 1/2 tablet x 1 week then 1 tablet x 1 week 14 Tab 0    meloxicam (MOBIC) 15 mg tablet Take 1 Tab by mouth daily.  10 Tab 0    gabapentin (NEURONTIN) 300 mg capsule Take 1 cap po q hs x 1 week then increase to 2 caps po q hs 60 Cap 1    metoprolol tartrate (LOPRESSOR) 25 mg tablet Take 0.5 Tabs by mouth two (2) times a day. 30 Tab 6    etonogestrel (NEXPLANON) 68 mg impl by SubDERmal route. Allergies  No Known Allergies    Family History  Family History   Problem Relation Age of Onset    Emphysema Mother     Cancer Neg Hx     Diabetes Neg Hx     Heart Disease Neg Hx     Hypertension Neg Hx     Stroke Neg Hx        Social History  Social History     Socioeconomic History    Marital status:      Spouse name: Not on file    Number of children: Not on file    Years of education: Not on file    Highest education level: Not on file   Occupational History    Not on file   Social Needs    Financial resource strain: Not on file    Food insecurity     Worry: Not on file     Inability: Not on file    Transportation needs     Medical: Not on file     Non-medical: Not on file   Tobacco Use    Smoking status: Never Smoker    Smokeless tobacco: Never Used   Substance and Sexual Activity    Alcohol use:  Yes     Alcohol/week: 2.0 standard drinks     Types: 2 Shots of liquor per week     Frequency: 2-4 times a month     Drinks per session: 1 or 2     Binge frequency: Never    Drug use: No    Sexual activity: Yes     Partners: Male     Birth control/protection: None   Lifestyle    Physical activity     Days per week: Not on file     Minutes per session: Not on file    Stress: Not on file   Relationships    Social connections     Talks on phone: Not on file     Gets together: Not on file     Attends Yazidi service: Not on file     Active member of club or organization: Not on file     Attends meetings of clubs or organizations: Not on file     Relationship status: Not on file    Intimate partner violence     Fear of current or ex partner: Not on file     Emotionally abused: Not on file     Physically abused: Not on file     Forced sexual activity: Not on file   Other Topics Concern    Not on file   Social History Narrative    ** Merged History Encounter **            Problem List  Patient Active Problem List   Diagnosis Code    SVT [facility]supraventricular tachycardia) ([facility]) I47.1    Palpitations R00.2    Obesity, morbid ([facility] [address] E66.01    Depression, major, recurrent, mild ([facility]) F33.0    Hyperlipidemia E78.5    Pregnancy Z34.90    Acute conjunctivitis of right eye H10.31    Incidental pregnancy Z33.1    Unprotected sex Z71.46    Nausea R11.0       Review of Systems  Review of Systems   Constitutional: Negative. HENT: Negative. Eyes: Positive for discharge and redness. Negative for photophobia and pain. Respiratory: Negative. Cardiovascular: Negative. Gastrointestinal: Positive for nausea. Neurological: Negative. Vital Signs  Vitals:    03/30/20 1541   BP: 112/72   Pulse: 88   Resp: 20   Temp: 97.6 °F (36.4 °C)   TempSrc: Oral   SpO2: 97%   Weight: 272 lb (123.4 kg)   Height: 5' 6\" (1.676 m)   PainSc:   3   PainLoc: Eye   LMP: 01/23/2020       Physical Exam  Physical Exam  Vitals signs and nursing note reviewed. Constitutional:       Appearance: Normal appearance. She is not ill-appearing. Eyes:      General:         Left eye: No discharge. Extraocular Movements: Extraocular movements intact. Conjunctiva/sclera:      Right eye: Right conjunctiva is injected. Pupils: Pupils are equal, round, and reactive to light. Comments: Right lower lid inflammed   Cardiovascular:      Rate and Rhythm: Normal rate and regular rhythm. Heart sounds: Normal heart sounds. Pulmonary:      Effort: Pulmonary effort is normal.      Breath sounds: Normal breath sounds. Skin:     General: Skin is warm and dry. Neurological:      Mental Status: She is alert and oriented to person, place, and time.    Psychiatric:         Behavior: Behavior normal.         Diagnostics  Orders Placed This Encounter    AMB POC GONADOTROPIN, CHORIONIC (HCG); QUALITATIVE    ondansetron (ZOFRAN ODT) 4 mg disintegrating tablet     Sig: Take 1 Tab by mouth every eight (8) hours as needed for Nausea, Vomiting or Nausea or Vomiting. Dispense:  12 Tab     Refill:  0    trimethoprim-polymyxin b (POLYTRIM) ophthalmic solution     Sig: Administer 1 Drop to right eye every six (6) hours for 7 days. Dispense:  1 Bottle     Refill:  0       Results  Results for orders placed or performed in visit on 03/30/20   AMB POC GONADOTROPIN, CHORIONIC (HCG); QUALITATIVE   Result Value Ref Range    VALID INTERNAL CONTROL POC Yes     HCG urine, Ql. (POC) Positive Negative    HCG blood, Ql. (POC)         Assessment and Plan  Diagnoses and all orders for this visit:    1. Acute conjunctivitis of right eye, unspecified acute conjunctivitis type  -     trimethoprim-polymyxin b (POLYTRIM) ophthalmic solution; Administer 1 Drop to right eye every six (6) hours for 7 days. Warm compresses, no rubbing eyes, discussed contagious precautions. 2. Incidental pregnancy  Pregnancy test positive. Strongly encouraged to follow up with cardiology/ gyn/ PCP regarding stopping medications. 3. Nausea  -     ondansetron (ZOFRAN ODT) 4 mg disintegrating tablet; Take 1 Tab by mouth every eight (8) hours as needed for Nausea, Vomiting or Nausea or Vomiting. Follow up with gyn if symptoms worsen/ do not improve    4. Unprotected sex  -     AMB POC GONADOTROPIN, CHORIONIC (HCG); QUALITATIVE  Positive in office      After care summary printed and reviewed with patient. Plan reviewed with patient. Questions answered. Patient verbalized understanding of plan and is in agreement with plan. Patient to follow up with PCP in one week or earlier if symptoms worsen/ do not improve. Encouraged the use of my chart.     ESA Giron

## 2020-07-17 DIAGNOSIS — I47.1 SVT (SUPRAVENTRICULAR TACHYCARDIA) (HCC): ICD-10-CM

## 2020-07-17 NOTE — TELEPHONE ENCOUNTER
Requested Prescriptions     Pending Prescriptions Disp Refills    metoprolol tartrate (LOPRESSOR) 25 mg tablet 30 Tab 6     Sig: Take 0.5 Tabs by mouth two (2) times a day.

## 2020-07-22 RX ORDER — METOPROLOL TARTRATE 25 MG/1
12.5 TABLET, FILM COATED ORAL 2 TIMES DAILY
Qty: 30 TAB | Refills: 2 | Status: SHIPPED | OUTPATIENT
Start: 2020-07-22 | End: 2021-02-05 | Stop reason: SDUPTHER

## 2021-02-05 ENCOUNTER — VIRTUAL VISIT (OUTPATIENT)
Dept: FAMILY MEDICINE CLINIC | Age: 31
End: 2021-02-05
Payer: MEDICAID

## 2021-02-05 DIAGNOSIS — I47.1 SVT (SUPRAVENTRICULAR TACHYCARDIA) (HCC): ICD-10-CM

## 2021-02-05 DIAGNOSIS — R42 POSTURAL DIZZINESS: Primary | ICD-10-CM

## 2021-02-05 DIAGNOSIS — F32.A DEPRESSION, UNSPECIFIED DEPRESSION TYPE: ICD-10-CM

## 2021-02-05 PROCEDURE — 99214 OFFICE O/P EST MOD 30 MIN: CPT | Performed by: NURSE PRACTITIONER

## 2021-02-05 RX ORDER — ESCITALOPRAM OXALATE 20 MG/1
20 TABLET ORAL DAILY
Qty: 90 TAB | Refills: 1 | Status: SHIPPED | OUTPATIENT
Start: 2021-02-05

## 2021-02-05 RX ORDER — METOPROLOL TARTRATE 25 MG/1
12.5 TABLET, FILM COATED ORAL 2 TIMES DAILY
Qty: 30 TAB | Refills: 2 | Status: SHIPPED | OUTPATIENT
Start: 2021-02-05

## 2021-02-05 NOTE — PROGRESS NOTES
Gloria Wilkerson presents today for   Chief Complaint   Patient presents with    Dizziness    Headache       Virtual/telephone visit    Depression Screening:  3 most recent PHQ Screens 2/5/2021   Little interest or pleasure in doing things Not at all   Feeling down, depressed, irritable, or hopeless Not at all   Total Score PHQ 2 0   Trouble falling or staying asleep, or sleeping too much -   Feeling tired or having little energy -   Poor appetite, weight loss, or overeating -   Feeling bad about yourself - or that you are a failure or have let yourself or your family down -   Trouble concentrating on things such as school, work, reading, or watching TV -   Moving or speaking so slowly that other people could have noticed; or the opposite being so fidgety that others notice -   Thoughts of being better off dead, or hurting yourself in some way -   PHQ 9 Score -   How difficult have these problems made it for you to do your work, take care of your home and get along with others -       Learning Assessment:  Learning Assessment 3/30/2020   PRIMARY LEARNER Patient   HIGHEST LEVEL OF EDUCATION - PRIMARY LEARNER  SOME COLLEGE   BARRIERS PRIMARY LEARNER NONE   CO-LEARNER CAREGIVER No   PRIMARY LANGUAGE ENGLISH   LEARNER PREFERENCE PRIMARY DEMONSTRATION   ANSWERED BY Patient   RELATIONSHIP SELF       Health Maintenance reviewed and discussed and ordered per Provider. Health Maintenance Due   Topic Date Due    COVID-19 Vaccine (1 of 2) 11/17/2006    DTaP/Tdap/Td series (1 - Tdap) 11/17/2011    PAP AKA CERVICAL CYTOLOGY  11/17/2011   . Coordination of Care:  1. Have you been to the ER, urgent care clinic since your last visit? Hospitalized since your last visit? yes    2. Have you seen or consulted any other health care providers outside of the [address] [place] [address] since your last visit? Include any pap smears or colon screening.  no

## 2021-02-05 NOTE — PROGRESS NOTES
Lourdes Knutson is a 27 y.o. female who was seen by synchronous (real-time) audio-video technology on 2/5/2021 for Dizziness and Headache    Assessment & Plan:   Diagnoses and all orders for this visit:    1. Postural dizziness  -     REFERRAL TO ENT-OTOLARYNGOLOGY    2. SVT (supraventricular tachycardia) (HCC)  -     metoprolol tartrate (LOPRESSOR) 25 mg tablet; Take 0.5 Tabs by mouth two (2) times a day. 3. Depression, unspecified depression type  -     escitalopram oxalate (LEXAPRO) 20 mg tablet; Take 1 Tab by mouth daily. Continue current treatment plans  Referral to ENT      Subjective: The patient presents for an Audio-visual teleconference appointment for dizziness. Patient is complaining of postural dizziness. She notes she gets dizzy with position changes. She is negative for any nausea. She notes her symptoms have been ongoing. She presents requesting a referral to ENT. Patient has a history of SVT and is prescribed metoprolol daily. Her last office visit with cardiology was in September, 2020. She is a patient of  [facility]. She is negative for any chest pain or palpitation. Patient also carries a medical diagnosis for depression. Patient  Notes that she is breast-feeding and continues to take Lexapro daily. She denies any suicidal homicidal ideations. Prior to Admission medications    Medication Sig Start Date End Date Taking? Authorizing Provider   metoprolol tartrate (LOPRESSOR) 25 mg tablet Take 0.5 Tabs by mouth two (2) times a day. 2/5/21  Yes jeff Mendes NP   escitalopram oxalate (LEXAPRO) 20 mg tablet Take 1 Tab by mouth daily.  2/5/21  Yes Adrianatwila [place]   ondansetron (ZOFRAN ODT) 4 mg disintegrating tablet Take 1 Tab by mouth every eight (8) hours as needed for Nausea, Vomiting or Nausea or Vomiting. 3/30/20  Yes Acosta Chaney NP     Patient Active Problem List   Diagnosis Code    SVT (supraventricular tachycardia) ([facility] [address] I47.1    Palpitations R00.2    Obesity, morbid ([facility]) E66.01    Depression, major, recurrent, mild ([facility]) F33.0    Hyperlipidemia E78.5    Pregnancy Z34.90    Acute conjunctivitis of right eye H10.31    Incidental pregnancy Z33.1    Unprotected sex Z71.46    Nausea R11.0     Patient Active Problem List    Diagnosis Date Noted    Acute conjunctivitis of right eye 2020    Incidental pregnancy 2020    Unprotected sex 2020    Nausea 2020    Hyperlipidemia 2019    Obesity, morbid ([facility] [address] 2019    Depression, major, recurrent, mild ([facility] [address] 2019    SVT (supraventricular tachycardia) ([facility]) 2015    Palpitations 2015    Pregnancy 2013     Current Outpatient Medications   Medication Sig Dispense Refill    metoprolol tartrate (LOPRESSOR) 25 mg tablet Take 0.5 Tabs by mouth two (2) times a day. 30 Tab 2    escitalopram oxalate (LEXAPRO) 20 mg tablet Take 1 Tab by mouth daily. 90 Tab 1    ondansetron (ZOFRAN ODT) 4 mg disintegrating tablet Take 1 Tab by mouth every eight (8) hours as needed for Nausea, Vomiting or Nausea or Vomiting.  12 Tab 0     Allergies   Allergen Reactions    Chicken Derived Anaphylaxis     Past Medical History:   Diagnosis Date    Anemia     Hypertension     pregnancy induced HTN    Mass of heart     Spon  w/o complication     SVT (supraventricular tachycardia) ([facility])     SVT (supraventricular tachycardia) ([facility])        ROS  ROS:  History obtained from the patient intake forms which are reviewed with the patient  · General: negative for - chills, fever, weight changes or malaise  · HEENT: no sore throat, nasal congestion, vision problems or ear problems  · Respiratory: no cough, shortness of breath, or wheezing  · Cardiovascular: no chest pain, palpitations, or dyspnea on exertion  · Gastrointestinal: no abdominal pain, N/V, change in bowel habits, or black or bloody stools  · Musculoskeletal: no back pain, joint pain, joint stiffness, muscle pain or muscle weakness  · Neurological: no numbness, tingling, headache or dizziness  · Endo:  No polyuria or polydipsia. · : no hematuria, dysuria, frequency, hesitancy, or nocturia. Psychological: Depression  Objective:   No flowsheet data found. [INSTRUCTIONS:  \"[x]\" Indicates a positive item  \"[]\" Indicates a negative item  -- DELETE ALL ITEMS NOT EXAMINED]    Constitutional: [x] Appears well-developed and well-nourished [x] No apparent distress      [] Abnormal -     Mental status: [x] Alert and awake  [x] Oriented to person/place/time [x] Able to follow commands    [] Abnormal -     Eyes:   EOM    [x]  Normal    [] Abnormal -   Sclera  [x]  Normal    [] Abnormal -          Discharge [x]  None visible   [] Abnormal -     HENT: [x] Normocephalic, atraumatic  [] Abnormal -   [x] Mouth/Throat: Mucous membranes are moist    External Ears [x] Normal  [] Abnormal -    Neck: [x] No visualized mass [] Abnormal -     Pulmonary/Chest: [x] Respiratory effort normal   [x] No visualized signs of difficulty breathing or respiratory distress        [] Abnormal -      Musculoskeletal:   [x] Normal gait with no signs of ataxia         [x] Normal range of motion of neck        [] Abnormal -     Neurological:        [x] No Facial Asymmetry (Cranial nerve 7 motor function) (limited exam due to video visit)          [x] No gaze palsy        [] Abnormal -          Skin:        [x] No significant exanthematous lesions or discoloration noted on facial skin         [] Abnormal -            Psychiatric:       [x] Normal Affect [] Abnormal -        [x] No Hallucinations    Other pertinent observable physical exam findings:-        We discussed the expected course, resolution and complications of the diagnosis(es) in detail. Medication risks, benefits, costs, interactions, and alternatives were discussed as indicated.   I advised her to contact the office if her condition worsens, changes or fails to improve as anticipated. She expressed understanding with the diagnosis(es) and plan. Tilman Hammans, who was evaluated through a patient-initiated, synchronous (real-time) audio-video encounter, and/or her healthcare decision maker, is aware that it is a billable service, with coverage as determined by her insurance carrier. She provided verbal consent to proceed: Yes, and patient identification was verified. It was conducted pursuant to the emergency declaration under the [address] [place] [address] and the [employer] Act. A caregiver was present when appropriate. Ability to conduct physical exam was limited. I was at home. The patient was at home.       Rambo Luther NP

## 2022-01-19 ENCOUNTER — TELEPHONE (OUTPATIENT)
Dept: FAMILY MEDICINE CLINIC | Age: 32
End: 2022-01-19

## 2022-01-19 NOTE — TELEPHONE ENCOUNTER
Patient states she was exposed to Covid on 1/13 at work. She returned to work on that day from having Covid. She says she has not had her sense of taste and smell since she was diagnosed. She says she has had diarrhea since Friday, 1/14. She was told if she test for covid, it will probably come back positive since she was recently diagnosed. Please advise.

## 2022-01-24 NOTE — TELEPHONE ENCOUNTER
Covid is a viral infection. No treatment at this time. The sense of smell and taste can last a lot longer than other symptoms.

## 2022-03-18 PROBLEM — H10.31 ACUTE CONJUNCTIVITIS OF RIGHT EYE: Status: ACTIVE | Noted: 2020-03-30

## 2022-03-18 PROBLEM — R11.0 NAUSEA: Status: ACTIVE | Noted: 2020-03-30

## 2022-03-19 PROBLEM — E66.01 OBESITY, MORBID (HCC): Status: ACTIVE | Noted: 2019-04-23

## 2022-03-19 PROBLEM — Z33.1 INCIDENTAL PREGNANCY: Status: ACTIVE | Noted: 2020-03-30

## 2022-03-19 PROBLEM — Z72.51 UNPROTECTED SEX: Status: ACTIVE | Noted: 2020-03-30

## 2022-03-20 PROBLEM — F33.0 DEPRESSION, MAJOR, RECURRENT, MILD (HCC): Status: ACTIVE | Noted: 2019-04-23

## 2022-03-20 PROBLEM — E78.5 HYPERLIPIDEMIA: Status: ACTIVE | Noted: 2019-05-14

## 2022-03-29 ENCOUNTER — APPOINTMENT (OUTPATIENT)
Dept: GENERAL RADIOLOGY | Age: 32
End: 2022-03-29
Attending: PHYSICIAN ASSISTANT
Payer: COMMERCIAL

## 2022-03-29 ENCOUNTER — HOSPITAL ENCOUNTER (EMERGENCY)
Age: 32
Discharge: HOME | End: 2022-03-29
Attending: EMERGENCY MEDICINE
Payer: COMMERCIAL

## 2022-03-29 VITALS
SYSTOLIC BLOOD PRESSURE: 122 MMHG | HEART RATE: 59 BPM | BODY MASS INDEX: 40.02 KG/M2 | HEIGHT: 67 IN | OXYGEN SATURATION: 100 % | DIASTOLIC BLOOD PRESSURE: 99 MMHG | WEIGHT: 255 LBS | TEMPERATURE: 98.8 F | RESPIRATION RATE: 15 BRPM

## 2022-03-29 DIAGNOSIS — R07.9 CHEST PAIN, UNSPECIFIED TYPE: Primary | ICD-10-CM

## 2022-03-29 LAB
ALBUMIN SERPL-MCNC: 3.8 G/DL (ref 3.4–5)
ALBUMIN/GLOB SERPL: 1.1 {RATIO} (ref 0.8–1.7)
ALP SERPL-CCNC: 62 U/L (ref 45–117)
ALT SERPL-CCNC: 43 U/L (ref 13–56)
ANION GAP SERPL CALC-SCNC: 4 MMOL/L (ref 3–18)
AST SERPL-CCNC: 39 U/L (ref 10–38)
ATRIAL RATE: 61 BPM
BASOPHILS # BLD: 0.1 K/UL (ref 0–0.1)
BASOPHILS NFR BLD: 1 % (ref 0–2)
BILIRUB SERPL-MCNC: 0.5 MG/DL (ref 0.2–1)
BUN SERPL-MCNC: 12 MG/DL (ref 7–18)
BUN/CREAT SERPL: 13 (ref 12–20)
CALCIUM SERPL-MCNC: 9.5 MG/DL (ref 8.5–10.1)
CALCULATED P AXIS, ECG09: 28 DEGREES
CALCULATED R AXIS, ECG10: 32 DEGREES
CALCULATED T AXIS, ECG11: 35 DEGREES
CHLORIDE SERPL-SCNC: 107 MMOL/L (ref 100–111)
CO2 SERPL-SCNC: 28 MMOL/L (ref 21–32)
CREAT SERPL-MCNC: 0.92 MG/DL (ref 0.6–1.3)
DIAGNOSIS, 93000: NORMAL
DIFFERENTIAL METHOD BLD: NORMAL
EOSINOPHIL # BLD: 0.3 K/UL (ref 0–0.4)
EOSINOPHIL NFR BLD: 4 % (ref 0–5)
ERYTHROCYTE [DISTWIDTH] IN BLOOD BY AUTOMATED COUNT: 12.5 % (ref 11.6–14.5)
GLOBULIN SER CALC-MCNC: 3.5 G/DL (ref 2–4)
GLUCOSE SERPL-MCNC: 83 MG/DL (ref 74–99)
HCG SERPL QL: NEGATIVE
HCT VFR BLD AUTO: 41 % (ref 35–45)
HGB BLD-MCNC: 13.6 G/DL (ref 12–16)
IMM GRANULOCYTES # BLD AUTO: 0 K/UL (ref 0–0.04)
IMM GRANULOCYTES NFR BLD AUTO: 0 % (ref 0–0.5)
LYMPHOCYTES # BLD: 2.7 K/UL (ref 0.9–3.6)
LYMPHOCYTES NFR BLD: 36 % (ref 21–52)
MCH RBC QN AUTO: 28.3 PG (ref 24–34)
MCHC RBC AUTO-ENTMCNC: 33.2 G/DL (ref 31–37)
MCV RBC AUTO: 85.4 FL (ref 78–100)
MONOCYTES # BLD: 0.6 K/UL (ref 0.05–1.2)
MONOCYTES NFR BLD: 8 % (ref 3–10)
NEUTS SEG # BLD: 3.8 K/UL (ref 1.8–8)
NEUTS SEG NFR BLD: 50 % (ref 40–73)
NRBC # BLD: 0 K/UL (ref 0–0.01)
NRBC BLD-RTO: 0 PER 100 WBC
P-R INTERVAL, ECG05: 152 MS
PLATELET # BLD AUTO: 282 K/UL (ref 135–420)
PMV BLD AUTO: 10.3 FL (ref 9.2–11.8)
POTASSIUM SERPL-SCNC: 4 MMOL/L (ref 3.5–5.5)
PROT SERPL-MCNC: 7.3 G/DL (ref 6.4–8.2)
Q-T INTERVAL, ECG07: 390 MS
QRS DURATION, ECG06: 90 MS
QTC CALCULATION (BEZET), ECG08: 392 MS
RBC # BLD AUTO: 4.8 M/UL (ref 4.2–5.3)
SODIUM SERPL-SCNC: 139 MMOL/L (ref 136–145)
TROPONIN-HIGH SENSITIVITY: <3 NG/L (ref 0–54)
TROPONIN-HIGH SENSITIVITY: <3 NG/L (ref 0–54)
VENTRICULAR RATE, ECG03: 61 BPM
WBC # BLD AUTO: 7.5 K/UL (ref 4.6–13.2)

## 2022-03-29 PROCEDURE — 84703 CHORIONIC GONADOTROPIN ASSAY: CPT

## 2022-03-29 PROCEDURE — 71045 X-RAY EXAM CHEST 1 VIEW: CPT

## 2022-03-29 PROCEDURE — 93005 ELECTROCARDIOGRAM TRACING: CPT

## 2022-03-29 PROCEDURE — 99285 EMERGENCY DEPT VISIT HI MDM: CPT

## 2022-03-29 PROCEDURE — 80053 COMPREHEN METABOLIC PANEL: CPT

## 2022-03-29 PROCEDURE — 84484 ASSAY OF TROPONIN QUANT: CPT

## 2022-03-29 PROCEDURE — 85025 COMPLETE CBC W/AUTO DIFF WBC: CPT

## 2022-03-29 NOTE — ED PROVIDER NOTES
EMERGENCY DEPARTMENT HISTORY AND PHYSICAL EXAM  This was created with voice recognition software and transcription errors may be present. 1:37 PM  Date: 3/29/2022  Patient Name: Koko Jain    History of Presenting Illness     Chief Complaint:    History Provided By:     HPI: Koko Jain is a 32 y.o. female past medical history of anemia hypertension SVT who presents with chest pain. 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 She states normally she gets this with palpitations when she has her SVT. No known coronary disease. Pain resolved without intervention she saw the school nurse who advised her to come in to get evaluated. PCP: Librado Barraza NP      Past History     Past Medical History:  Past Medical History:   Diagnosis Date    Anemia     Hypertension     pregnancy induced HTN    Mass of heart     Spon  w/o complication     SVT (supraventricular tachycardia) (HCC)     SVT (supraventricular tachycardia) (H[facility])        Past Surgical History:  Past Surgical History:   Procedure Laterality Date    HX GYN      left ovarian cyst removed    HX SVT ABLATION      HX TONSIL AND ADENOIDECTOMY      HX TONSILLECTOMY  no date noted in HX    tonsils, adnoids & tues       Family History:  Family History   Problem Relation Age of Onset    Emphysema Mother     Cancer Neg Hx     Diabetes Neg Hx     Heart Disease Neg Hx     Hypertension Neg Hx     Stroke Neg Hx        Social History:  Social History     Tobacco Use    Smoking status: Never Smoker    Smokeless tobacco: Never Used   Substance Use Topics    Alcohol use: Not Currently     Alcohol/week: 0.0 standard drinks    Drug use: No       Allergies:   Allergies   Allergen Reactions    Chicken Derived Anaphylaxis       Review of Systems     Review of Systems   All other systems reviewed and are negative. 10 point review of systems otherwise negative unless noted in HPI. Physical Exam       Physical Exam  Constitutional:       Appearance: She is well-developed. HENT:      Head: Normocephalic and atraumatic. Eyes:      Pupils: Pupils are equal, round, and reactive to light. Cardiovascular:      Rate and Rhythm: Normal rate and regular rhythm. Heart sounds: Normal heart sounds. No murmur heard. No friction rub. Pulmonary:      Effort: Pulmonary effort is normal. No respiratory distress. Breath sounds: Normal breath sounds. No wheezing. Abdominal:      General: There is no distension. Palpations: Abdomen is soft. Tenderness: There is no abdominal tenderness. There is no guarding or rebound. Musculoskeletal:         General: Normal range of motion. Cervical back: Normal range of motion and neck supple. Skin:     General: Skin is warm and dry. Neurological:      Mental Status: She is alert and oriented to person, place, and time. Psychiatric:         Behavior: Behavior normal.         Thought Content: Thought content normal.         Diagnostic Study Results     Vital Signs  EKG: EKG shows sinus at 61 with a normal axis normal intervals there is no ST elevation or depression no hypertrophy  Labs:   Imaging:     Medical Decision Making     ED Course: Progress Notes, Reevaluation, and Consults:    I will be the provider of record for this patient. Provider Notes (Medical Decision Making):   Patient with sharp left-sided chest pain somewhat atypical has a history of SVT follows with cardiology will check troponin x2 likely refer for outpatient stress      Heart score 0-1; will dc/ family med follow up. Diagnosis     Clinical Impression: No diagnosis found.     Disposition:        Patient's Medications   Start Taking    No medications on file   Continue Taking    ESCITALOPRAM OXALATE (LEXAPRO) 20 MG TABLET    Take 1 Tab by mouth daily. METOPROLOL TARTRATE (LOPRESSOR) 25 MG TABLET    Take 0.5 Tabs by mouth two (2) times a day. ONDANSETRON (ZOFRAN ODT) 4 MG DISINTEGRATING TABLET    Take 1 Tab by mouth every eight (8) hours as needed for Nausea, Vomiting or Nausea or Vomiting.    These Medications have changed    No medications on file   Stop Taking    No medications on file

## 2022-05-15 ENCOUNTER — APPOINTMENT (OUTPATIENT)
Dept: GENERAL RADIOLOGY | Age: 32
End: 2022-05-15
Attending: EMERGENCY MEDICINE
Payer: COMMERCIAL

## 2022-05-15 ENCOUNTER — HOSPITAL ENCOUNTER (EMERGENCY)
Age: 32
Discharge: HOME | End: 2022-05-15
Attending: EMERGENCY MEDICINE
Payer: COMMERCIAL

## 2022-05-15 VITALS
TEMPERATURE: 98.3 F | BODY MASS INDEX: 41.14 KG/M2 | DIASTOLIC BLOOD PRESSURE: 98 MMHG | WEIGHT: 256 LBS | OXYGEN SATURATION: 95 % | HEART RATE: 82 BPM | RESPIRATION RATE: 18 BRPM | HEIGHT: 66 IN | SYSTOLIC BLOOD PRESSURE: 133 MMHG

## 2022-05-15 DIAGNOSIS — H66.90 ACUTE OTITIS MEDIA, UNSPECIFIED OTITIS MEDIA TYPE: ICD-10-CM

## 2022-05-15 DIAGNOSIS — M54.12 CERVICAL RADICULAR PAIN: Primary | ICD-10-CM

## 2022-05-15 DIAGNOSIS — R03.0 ELEVATED BLOOD PRESSURE READING: ICD-10-CM

## 2022-05-15 PROCEDURE — 99283 EMERGENCY DEPT VISIT LOW MDM: CPT

## 2022-05-15 PROCEDURE — 72050 X-RAY EXAM NECK SPINE 4/5VWS: CPT

## 2022-05-15 RX ORDER — ACETAMINOPHEN AND CODEINE PHOSPHATE 300; 30 MG/1; MG/1
1 TABLET ORAL
Qty: 5 TABLET | Refills: 0 | Status: SHIPPED | OUTPATIENT
Start: 2022-05-15 | End: 2022-05-18

## 2022-05-15 RX ORDER — AMOXICILLIN AND CLAVULANATE POTASSIUM 875; 125 MG/1; MG/1
1 TABLET, FILM COATED ORAL 2 TIMES DAILY
Qty: 20 TABLET | Refills: 0 | Status: SHIPPED | OUTPATIENT
Start: 2022-05-15 | End: 2022-05-25

## 2022-05-15 RX ORDER — METHYLPREDNISOLONE 4 MG/1
TABLET ORAL
Qty: 1 DOSE PACK | Refills: 0 | Status: SHIPPED | OUTPATIENT
Start: 2022-05-15 | End: 2022-06-06

## 2022-05-15 RX ORDER — CYCLOBENZAPRINE HCL 10 MG
10 TABLET ORAL
Qty: 15 TABLET | Refills: 0 | Status: SHIPPED | OUTPATIENT
Start: 2022-05-15 | End: 2022-06-06

## 2022-05-15 NOTE — Clinical Note
Param Kraus was seen and treated in our emergency department on 5/15/2022.     Please allow the patient to be on light duty until 5/23/2020    Brisa Montalvo MD

## 2022-05-15 NOTE — ED PROVIDER NOTES
EMERGENCY DEPARTMENT HISTORY AND PHYSICAL EXAM    4:33 PM      Date: 5/15/2022  Patient Name: Aidan Sanders    History of Presenting Illness     Chief Complaint   Patient presents with    Neck Pain         History Provided By: Patient  Location/Duration/Severity/Modifying factors   Patient is a 75-year-old female without significant medical history the presents emergency department with complaint of 3 weeks of neck pain this been increasing especially with movement and feels some shooting pain into her left shoulder. Patient has also had some heavy nasal drainage recently and attributes it to her allergies which are fairly bad this time year. The patient's had a hysterectomy and denies any chance being pregnant. Patient denies any nausea, vomiting, headache, fevers, chills, or photophobia. Patient denies any trauma. 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 Patient would like to be on light duty for the week of possible given her neck pain. Patient denies any other aggravating or alleviating factors. Patient is not a smoker, occasional drinker, and denies any drug use. PCP: Mohamud Hardy NP    Current Outpatient Medications   Medication Sig Dispense Refill    methylPREDNISolone (Medrol, Rafael,) 4 mg tablet As directed 1 Dose Pack 0    amoxicillin-clavulanate (Augmentin) 875-125 mg per tablet Take 1 Tablet by mouth two (2) times a day for 10 days. 20 Tablet 0    cyclobenzaprine (FLEXERIL) 10 mg tablet Take 1 Tablet by mouth three (3) times daily as needed for Muscle Spasm(s). 15 Tablet 0    acetaminophen-codeine (Tylenol-Codeine #3) 300-30 mg per tablet Take 1 Tablet by mouth every four (4) hours as needed for Pain (If your symptoms are controlled by over-the-counter Tylenol) for up to 3 days. Max Daily Amount: 6 Tablets. 5 Tablet 0    metoprolol tartrate (LOPRESSOR) 25 mg tablet Take 0.5 Tabs by mouth two (2) times a day.  30 Tab 2    escitalopram oxalate (LEXAPRO) 20 mg tablet Take 1 Tab by mouth daily. 80 Tab 1       Past History     Past Medical History:  Past Medical History:   Diagnosis Date    Anemia     Hypertension     pregnancy induced HTN    Mass of heart     Spon  w/o complication     SVT (supraventricular tachycardia) (HCC)     SVT (supraventricular tachycardia) (HCC)        Past Surgical History:  Past Surgical History:   Procedure Laterality Date    HX GYN      left ovarian cyst removed    HX SVT ABLATION      HX TONSIL AND ADENOIDECTOMY      HX TONSILLECTOMY  no date noted in HX    tonsils, adnoids & tues       Family History:  Family History   Problem Relation Age of Onset    Emphysema Mother     Cancer Neg Hx     Diabetes Neg Hx     Heart Disease Neg Hx     Hypertension Neg Hx     Stroke Neg Hx        Social History:  Social History     Tobacco Use    Smoking status: Never Smoker    Smokeless tobacco: Never Used   Substance Use Topics    Alcohol use: Yes     Alcohol/week: 0.0 standard drinks     Comment: occ    Drug use: No       Allergies: Allergies   Allergen Reactions    Chicken Derived Anaphylaxis         Review of Systems       Review of Systems   Constitutional: Negative for activity change, fatigue and fever. HENT: Positive for congestion, ear pain and rhinorrhea. Eyes: Negative for visual disturbance. Respiratory: Negative for shortness of breath. Cardiovascular: Negative for chest pain and palpitations. Gastrointestinal: Negative for abdominal pain, diarrhea, nausea and vomiting. Genitourinary: Negative for dysuria and hematuria. Musculoskeletal: Positive for neck pain. Negative for back pain. Skin: Negative for rash. Neurological: Negative for dizziness, weakness and light-headedness. All other systems reviewed and are negative.         Physical Exam     Visit Vitals  BP (!) 133/98 (BP 1 Location: Left upper arm, BP Patient Position: At rest)   Pulse 82 Temp 98.3 °F (36.8 °C)   Resp 18   Ht 5' 6\" (1.676 m)   Wt 116.1 kg (256 lb)   LMP 05/09/2022   SpO2 95%   Breastfeeding No   BMI 41.32 kg/m²         Physical Exam  Vitals and nursing note reviewed. Constitutional:       General: She is not in acute distress. Appearance: She is well-developed. Comments: Elevated BMI, no distress, nasal congestion noted  Nontoxic appearing, smiling, ambulating well   HENT:      Head: Normocephalic and atraumatic. Right Ear: External ear normal.      Left Ear: External ear normal.      Ears:      Comments: Bilateral tympanic membrane's are bulging, dull     Nose: Nose normal.   Eyes:      General: No scleral icterus. Conjunctiva/sclera: Conjunctivae normal.      Pupils: Pupils are equal, round, and reactive to light. Neck:      Thyroid: No thyromegaly. Vascular: No JVD. Trachea: No tracheal deviation. Comments: Radicular pain noted with compression of her cervical spine, full range of motion noted, pain into her arm increases with range of motion  Cardiovascular:      Rate and Rhythm: Normal rate and regular rhythm. Heart sounds: Normal heart sounds. No murmur heard. No friction rub. No gallop. Pulmonary:      Effort: Pulmonary effort is normal.      Breath sounds: Normal breath sounds. Chest:      Chest wall: No tenderness. Abdominal:      General: Bowel sounds are normal. There is no distension. Palpations: Abdomen is soft. Tenderness: There is no abdominal tenderness. There is no guarding or rebound. Musculoskeletal:         General: No tenderness. Normal range of motion. Cervical back: Normal range of motion and neck supple. Lymphadenopathy:      Cervical: No cervical adenopathy. Skin:     General: Skin is warm and dry. Neurological:      Mental Status: She is alert and oriented to person, place, and time. Cranial Nerves: No cranial nerve deficit.       Coordination: Coordination normal.      Comments: No sensory loss, Gait normal, Motor 5/5   Psychiatric:         Behavior: Behavior normal.         Thought Content: Thought content normal.         Judgment: Judgment normal.      Comments: Supportive family with the patient           Diagnostic Study Results     Labs -  No results found for this or any previous visit (from the past 12 hour(s)). Radiologic Studies -   XR SPINE CERV 4 OR 5 V   Final Result   1. No acute pathology appreciated in the cervical spine. Medical Decision Making   I am the first provider for this patient. I reviewed the vital signs, available nursing notes, past medical history, past surgical history, family history and social history. Vital Signs-Reviewed the patient's vital signs. Records Reviewed: Nursing Notes, Old Medical Records, Previous Radiology Studies and Previous Laboratory Studies (Time of Review: 4:33 PM)    ED Course: Progress Notes, Reevaluation, and Consults: Workup and recommendations were reviewed with the patient and all questions were answered. The patient understands the plan and will proceed with close outpatient care. I have encouraged the patient to return if at all worsened or concerned. Pieter Magallon DO 9:56 PM      Provider Notes (Medical Decision Making):   MDM  Number of Diagnoses or Management Options  Acute otitis media, unspecified otitis media type  Cervical radicular pain  Elevated blood pressure reading  Diagnosis management comments: Patient is a 54-year-old female with a history of SVT, anemia, history of tonsillectomy, who presents emergency department with complaint of neck pain radiating down to her shoulder associate with neck movement, and nasal congestion. The patient's nontoxic and afebrile and on evaluation has likely radicular symptoms and I suspect cervical radiculopathy likely disc disease.   Patient also has bilateral otitis media and notes that she has allergic rhinitis and I suspect she has sinus disease. Do not suspect central infection however will start antibiotics, pain control, steroid taper will help with her allergies as well as with the radicular symptoms, and refer her back to her primary doctor as well as to the spine center and the patient is to return if at all worsened or concerned. Procedures        Diagnosis     Clinical Impression:   1. Cervical radicular pain    2. Acute otitis media, unspecified otitis media type    3. Elevated blood pressure reading        Disposition: DC    Follow-up Information     Follow up With Specialties Details Why Contact Info    Colt Potter NP Nurse Practitioner In 2 days Discussed the need for MRI and also have your blood pressure rechecked [facility]      Jerry Fallon MD Orthopedic Surgery In 1 week  [address] [address]  [facility] EM[facility] Emergency Medicine  As needed, If symptoms worsen [address] [facility]  [phone]           Patient's Medications   Start Taking    ACETAMINOPHEN-CODEINE (TYLENOL-CODEINE #3) 300-30 MG PER TABLET    Take 1 Tablet by mouth every four (4) hours as needed for Pain (If your symptoms are controlled by over-the-counter Tylenol) for up to 3 days. Max Daily Amount: 6 Tablets. AMOXICILLIN-CLAVULANATE (AUGMENTIN) 875-125 MG PER TABLET    Take 1 Tablet by mouth two (2) times a day for 10 days. CYCLOBENZAPRINE (FLEXERIL) 10 MG TABLET    Take 1 Tablet by mouth three (3) times daily as needed for Muscle Spasm(s). METHYLPREDNISOLONE (MEDROL, MAXIM,) 4 MG TABLET    As directed   Continue Taking    ESCITALOPRAM OXALATE (LEXAPRO) 20 MG TABLET    Take 1 Tab by mouth daily. METOPROLOL TARTRATE (LOPRESSOR) 25 MG TABLET    Take 0.5 Tabs by mouth two (2) times a day.    These Medications have changed    No medications on file   Stop Taking    ONDANSETRON (ZOFRAN ODT) 4 MG DISINTEGRATING TABLET    Take 1 Tab by mouth every eight (8) hours as needed for Nausea, Vomiting or Nausea or Vomiting. Disclaimer: Sections of this note are dictated using utilizing voice recognition software. Minor typographical errors may be present. If questions arise, please do not hesitate to contact me or call our department.

## 2022-05-15 NOTE — DISCHARGE INSTRUCTIONS
Make sure to take your medications, I have given you some Tylenol 3 in case you do not have any relief with over-the-counter Tylenol. While you are on the steroid pack do not suggest taking ibuprofen because it can irritate your stomach. Please return if you have any fevers, worsening symptoms, lights bothering your eyes, or you are at all concerned.

## 2022-05-15 NOTE — ED TRIAGE NOTES
Patient states awakening with stiff neck three weeks ago. She states multiple attempts at \"popping\" her neck to relief pain and stiffness. She states tripping over her dog at 0400 this morning and striking left shoulder into doorframe. She c/o burning sensation to posterior neck since tripping.

## 2022-06-06 ENCOUNTER — HOSPITAL ENCOUNTER (EMERGENCY)
Age: 32
Discharge: HOME | End: 2022-06-06
Attending: STUDENT IN AN ORGANIZED HEALTH CARE EDUCATION/TRAINING PROGRAM
Payer: COMMERCIAL

## 2022-06-06 VITALS
TEMPERATURE: 98.2 F | SYSTOLIC BLOOD PRESSURE: 150 MMHG | BODY MASS INDEX: 40.81 KG/M2 | RESPIRATION RATE: 16 BRPM | OXYGEN SATURATION: 98 % | HEART RATE: 62 BPM | HEIGHT: 67 IN | DIASTOLIC BLOOD PRESSURE: 93 MMHG | WEIGHT: 260 LBS

## 2022-06-06 DIAGNOSIS — M54.12 CERVICAL RADICULOPATHY: Primary | ICD-10-CM

## 2022-06-06 PROCEDURE — 99283 EMERGENCY DEPT VISIT LOW MDM: CPT

## 2022-06-06 RX ORDER — METHOCARBAMOL 750 MG/1
1500 TABLET, FILM COATED ORAL
Qty: 30 TABLET | Refills: 0 | Status: SHIPPED | OUTPATIENT
Start: 2022-06-06

## 2022-06-06 NOTE — Clinical Note
[facility] EMERGENCY DEPT  [address] [facility] [address] [phone]    Work/School Note    Date: 6/6/2022    To Whom It May concern:    Miesha Maddox was seen and treated today in the emergency room by the following provider(s):  Attending Provider: Porsha Rodriguez DO  Nurse Practitioner: AMPARO Abdi. Ebb Doroteo Maddox is excused from work/school on 6/6/2022 through 6/8/2022. She is medically clear to return to work/school on 6/9/2022.          Sincerely,          AMPARO Stewart

## 2022-06-06 NOTE — ED PROVIDER NOTES
EMERGENCY DEPARTMENT HISTORY AND PHYSICAL EXAM    Date: (Not on file)  Patient Name: Daniel Meyer    History of Presenting Illness     Chief Complaint   Patient presents with    Neck Pain       History Provided By: Patient    Additional History (Context): Miesha Duenas is a 28-year-old female with past medical history significant for hypertension and anemia who presents to the ER with complaints of ongoing pain and left-sided neck radiating to the left upper extremity. Recently diagnosed with cervical radiculopathy. Was given muscle relaxers which did help. States she is unable to get into her PCP for the next few months. Denies any injury or trauma. No paresthesia or weakness. Alternating Tylenol and Motrin and heating pads which does seem to help. PCP: Brie Huddleston NP    Current Outpatient Medications   Medication Sig Dispense Refill    methocarbamoL (Robaxin-750) 750 mg tablet Take 2 Tablets by mouth nightly as needed for Muscle Spasm(s) or Pain. 30 Tablet 0    metoprolol tartrate (LOPRESSOR) 25 mg tablet Take 0.5 Tabs by mouth two (2) times a day. 30 Tab 2    escitalopram oxalate (LEXAPRO) 20 mg tablet Take 1 Tab by mouth daily.  80 Tab 1       Past History     Past Medical History:  Past Medical History:   Diagnosis Date    Anemia     Hypertension     pregnancy induced HTN    Mass of heart     Neck pain     Spon  w/o complication     SVT (supraventricular tachycardia) (HCC)     SVT (supraventricular tachycardia) (HCC)        Past Surgical History:  Past Surgical History:   Procedure Laterality Date    HX GYN      left ovarian cyst removed    HX SVT ABLATION      HX TONSIL AND ADENOIDECTOMY      HX TONSILLECTOMY  no date noted in HX    tonsils, adnoids & tues       Family History:  Family History   Problem Relation Age of Onset    Emphysema Mother     Cancer Neg Hx     Diabetes Neg Hx     Heart Disease Neg Hx     Hypertension Neg Hx  Stroke Neg Hx        Social History:  Social History     Tobacco Use    Smoking status: Never Smoker    Smokeless tobacco: Never Used   Substance Use Topics    Alcohol use: Yes     Alcohol/week: 0.0 standard drinks     Comment: occ    Drug use: No       Allergies: Allergies   Allergen Reactions    Chicken Derived Anaphylaxis         Review of Systems     Review of Systems   Constitutional: Negative for chills and fever. HENT: Negative for nasal congestion, sore throat, rhinorrhea  Eyes: Negative. Respiratory: Negative for cough and for shortness of breath. Cardiovascular: Negative for chest pain and palpitations. Gastrointestinal: Negative for abdominal pain, constipation, diarrhea, nausea and vomiting. Genitourinary: Negative. Negative for difficulty urinating and flank pain. Musculoskeletal: Positive for neck pain with radiation to the left upper extremity. Negative for back pain. Negative for gait problem. Allergic/Immunologic: Negative. Neurological: Negative for dizziness, weakness, numbness and headaches. Psychiatric/Behavioral: Negative. All other systems reviewed and are negative. All Other Systems Negative    Physical Exam     Vitals:    06/06/22 1621   BP: (!) 150/93   Pulse: 62   Resp: 16   Temp: 98.2 °F (36.8 °C)   SpO2: 98%   Weight: 117.9 kg (260 lb)   Height: 5' 7\" (1.702 m)     Physical Exam  Vitals and nursing note reviewed. Constitutional:       General: She is not in acute distress. Appearance: Normal appearance. She is not ill-appearing, toxic-appearing or diaphoretic. HENT:      Head: Normocephalic and atraumatic. Nose: Nose normal.      Mouth/Throat:      Mouth: Mucous membranes are moist.      Pharynx: Oropharynx is clear. Eyes:      General: Lids are normal. Vision grossly intact. Conjunctiva/sclera: Conjunctivae normal.   Cardiovascular:      Rate and Rhythm: Normal rate and regular rhythm. Pulses: Normal pulses.       Heart sounds: Normal heart sounds. Pulmonary:      Effort: Pulmonary effort is normal. No respiratory distress. Breath sounds: Normal breath sounds. No stridor. No wheezing, rhonchi or rales. Chest:      Chest wall: No tenderness. Abdominal:      Palpations: Abdomen is soft. Tenderness: There is no abdominal tenderness. There is no right CVA tenderness, left CVA tenderness or guarding. Musculoskeletal:         General: Normal range of motion. Cervical back: Full passive range of motion without pain, normal range of motion and neck supple. Spasms and tenderness (Point tender over the left trapezius muscle) present. No swelling, edema, deformity, signs of trauma, bony tenderness or crepitus. Normal range of motion. Thoracic back: Normal.      Lumbar back: Normal.   Lymphadenopathy:      Cervical: No cervical adenopathy. Skin:     General: Skin is warm and dry. Capillary Refill: Capillary refill takes less than 2 seconds. Neurological:      General: No focal deficit present. Mental Status: She is alert and oriented to person, place, and time. Psychiatric:         Mood and Affect: Mood normal.         Behavior: Behavior normal. Behavior is cooperative. Diagnostic Study Results     Labs -   No results found for this or any previous visit (from the past 12 hour(s)). Radiologic Studies -   No orders to display     CT Results  (Last 48 hours)    None        CXR Results  (Last 48 hours)    None            Medical Decision Making   I am the first provider for this patient. I reviewed the vital signs, available nursing notes, past medical history, past surgical history, family history and social history. Vital Signs-Reviewed the patient's vital signs.         Records Reviewed: Nursing notes, old medical records and any previous labs, imaging, visits, consultations pertinent to patient care    Procedures:  Procedures    ED Course: Progress Notes, Reevaluation, and Consults:  4:18 PM  Initial assessment performed. The patients presenting problems have been discussed, and they/their family are in agreement with the care plan formulated and outlined with them. I have encouraged them to ask questions as they arise throughout their visit. Provider Notes (Medical Decision Making):     Differential diagnosis: Cervical radiculopathy, sprain/strain, dislocation, fracture    Patient is a 19-year-old female who presents with complaints of neck pain. No injury or trauma. Has a completely normal neurologic exam and symptoms consistent with cervical radiculopathy. Give her referral for Ortho/spine and additional muscle relaxers which did help in the past.  She will need close follow-up and I also discussed ER return precautions. No indications for emergent imaging at this time. MED RECONCILIATION:  No current facility-administered medications for this encounter. Current Outpatient Medications   Medication Sig    methocarbamoL (Robaxin-750) 750 mg tablet Take 2 Tablets by mouth nightly as needed for Muscle Spasm(s) or Pain.  metoprolol tartrate (LOPRESSOR) 25 mg tablet Take 0.5 Tabs by mouth two (2) times a day.  escitalopram oxalate (LEXAPRO) 20 mg tablet Take 1 Tab by mouth daily. Disposition:  Home in stable condition    DISCHARGE NOTE:     Patient has been reexamined. Patient has no new complaints, changes, or physical findings. Care plan outlined and precautions discussed. Discussed proper way to take medications. Discussed treatment plan, return precautions, symptomatic relief, and expected time to improvement. All questions answered. Patient is stable for discharge and outpatient management. Patient is ready to go home.     Follow-up Information     Follow up With Specialties Details Why Contact Info    Maria Figueroa MD Orthopedic Surgery Schedule an appointment as soon as possible for a visit  Follow-up from the Emergency Department [address] [place] [address] [address]  [facility] [address]  [phone]      [facility] EMERGENCY DEPT Emergency Medicine  As needed, If symptoms worsen [address] [facility]  [phone]          Current Discharge Medication List      START taking these medications    Details   methocarbamoL (Robaxin-750) 750 mg tablet Take 2 Tablets by mouth nightly as needed for Muscle Spasm(s) or Pain. Qty: 30 Tablet, Refills: 0  Start date: 6/6/2022                   Diagnosis     Clinical Impression:   1. Cervical radiculopathy          Dictation disclaimer:  Please note that this dictation was completed with [employer], the [employer] voice recognition software. Quite often unanticipated grammatical, syntax, homophones, and other interpretive errors are inadvertently transcribed by the computer software. Please disregard these errors. Please excuse any errors that have escaped final proofreading.

## 2022-06-06 NOTE — ED TRIAGE NOTES
Patient c/o neck pain that continues since last visit. She denies f/u with ortho. States that she is unable to get an appointment with her PCP until September 28.

## 2022-09-20 ENCOUNTER — APPOINTMENT (OUTPATIENT)
Dept: GENERAL RADIOLOGY | Age: 32
End: 2022-09-20
Attending: EMERGENCY MEDICINE
Payer: COMMERCIAL

## 2022-09-20 ENCOUNTER — HOSPITAL ENCOUNTER (EMERGENCY)
Age: 32
Discharge: HOME | End: 2022-09-20
Attending: EMERGENCY MEDICINE
Payer: COMMERCIAL

## 2022-09-20 VITALS
SYSTOLIC BLOOD PRESSURE: 144 MMHG | DIASTOLIC BLOOD PRESSURE: 97 MMHG | HEART RATE: 83 BPM | WEIGHT: 250 LBS | BODY MASS INDEX: 39.24 KG/M2 | RESPIRATION RATE: 18 BRPM | OXYGEN SATURATION: 95 % | HEIGHT: 67 IN | TEMPERATURE: 97.3 F

## 2022-09-20 DIAGNOSIS — M43.6 TORTICOLLIS: Primary | ICD-10-CM

## 2022-09-20 LAB — HCG UR QL: NEGATIVE

## 2022-09-20 PROCEDURE — 96372 THER/PROPH/DIAG INJ SC/IM: CPT

## 2022-09-20 PROCEDURE — 81025 URINE PREGNANCY TEST: CPT

## 2022-09-20 PROCEDURE — 74011250636 HC RX REV CODE- 250/636: Performed by: EMERGENCY MEDICINE

## 2022-09-20 PROCEDURE — 73030 X-RAY EXAM OF SHOULDER: CPT

## 2022-09-20 PROCEDURE — 72040 X-RAY EXAM NECK SPINE 2-3 VW: CPT

## 2022-09-20 PROCEDURE — 99284 EMERGENCY DEPT VISIT MOD MDM: CPT

## 2022-09-20 RX ORDER — CYCLOBENZAPRINE HCL 10 MG
10 TABLET ORAL 3 TIMES DAILY
Qty: 12 TABLET | Refills: 0 | Status: SHIPPED | OUTPATIENT
Start: 2022-09-20 | End: 2022-10-24 | Stop reason: SDUPTHER

## 2022-09-20 RX ORDER — IBUPROFEN 800 MG/1
800 TABLET ORAL EVERY 8 HOURS
Qty: 15 TABLET | Refills: 0 | Status: SHIPPED | OUTPATIENT
Start: 2022-09-20 | End: 2022-09-25

## 2022-09-20 RX ORDER — KETOROLAC TROMETHAMINE 15 MG/ML
15 INJECTION, SOLUTION INTRAMUSCULAR; INTRAVENOUS
Status: COMPLETED | OUTPATIENT
Start: 2022-09-20 | End: 2022-09-20

## 2022-09-20 RX ADMIN — KETOROLAC TROMETHAMINE 15 MG: 15 INJECTION, SOLUTION INTRAMUSCULAR; INTRAVENOUS at 11:16

## 2022-09-20 NOTE — Clinical Note
[address] [place] [address] [address] [facility] EMERGENCY DEPT  [address] [address] [phone]    Work/School Note    Date: 9/20/2022    To Whom It May concern:      Miesha Strickland was seen and treated today in the emergency room by the following provider(s):  Attending Provider: Jameel Corona MD  Physician Assistant: SEUN Ibrahim. Shey Strickland is excused from work/school on 09/20/22. She is clear to return to work/school on 09/21/22.         Sincerely,          SEUN Berrios

## 2022-09-20 NOTE — ED PROVIDER NOTES
EMERGENCY DEPARTMENT HISTORY AND PHYSICAL EXAM    Date: 2022  Patient Name: Renetta Kyle    History of Presenting Illness     Chief Complaint   Patient presents with    Neck Pain         History Provided By: Patient      Additional History (Context): Renetta Kyle is a 32 y.o. female with hypertension and anemia, SVT  who presents with complaint of neck pain radiating to her left shoulder. Symptoms began today. She has had this tightening feeling previously. Denies any trauma or chest pain shortness of breath. Pain is worse with movement particularly of her neck. Patient has a history of a BTL but thinks she had a miscarriage this weekend. PCP: Jasmyn Bryan NP    Current Outpatient Medications   Medication Sig Dispense Refill    ibuprofen (MOTRIN) 800 mg tablet Take 1 Tablet by mouth every eight (8) hours for 5 days. 15 Tablet 0    cyclobenzaprine (FLEXERIL) 10 mg tablet Take 1 Tablet by mouth three (3) times daily. 12 Tablet 0    methocarbamoL (Robaxin-750) 750 mg tablet Take 2 Tablets by mouth nightly as needed for Muscle Spasm(s) or Pain. 30 Tablet 0    metoprolol tartrate (LOPRESSOR) 25 mg tablet Take 0.5 Tabs by mouth two (2) times a day. 30 Tab 2    escitalopram oxalate (LEXAPRO) 20 mg tablet Take 1 Tab by mouth daily.  80 Tab 1       Past History     Past Medical History:  Past Medical History:   Diagnosis Date    Anemia     Hypertension     pregnancy induced HTN    Mass of heart     Neck pain     Spon  w/o complication 1564    SVT (supraventricular tachycardia) (HCC)     SVT (supraventricular tachycardia) (HCC)        Past Surgical History:  Past Surgical History:   Procedure Laterality Date    HX GYN      left ovarian cyst removed    HX SVT ABLATION      HX TONSIL AND ADENOIDECTOMY      HX TONSILLECTOMY  no date noted in HX    tonsils, adnoids & tues       Family History:  Family History   Problem Relation Age of Onset    Emphysema Mother     Cancer Neg Hx     Diabetes Neg Hx     Heart Disease Neg Hx     Hypertension Neg Hx     Stroke Neg Hx        Social History:  Social History     Tobacco Use    Smoking status: Never    Smokeless tobacco: Never   Substance Use Topics    Alcohol use: Yes     Alcohol/week: 0.0 standard drinks     Comment: occ    Drug use: No       Allergies: Allergies   Allergen Reactions    Chicken Derived Anaphylaxis         Review of Systems   Review of Systems   Musculoskeletal:  Positive for arthralgias, neck pain and neck stiffness. Neurological:  Negative for weakness and numbness. All Other Systems Negative  Physical Exam     Vitals:    09/20/22 0925 09/20/22 0927   BP:  (!) 144/97   Pulse: 83    Resp: 18    Temp: 97.3 °F (36.3 °C)    SpO2: 95%    Weight: 113.4 kg (250 lb)    Height: 5' 7\" (1.702 m)      Physical Exam  Vitals and nursing note reviewed. Constitutional:       Appearance: She is well-developed. HENT:      Head: Normocephalic and atraumatic. Right Ear: External ear normal.      Left Ear: External ear normal.      Nose: Nose normal.   Eyes:      Conjunctiva/sclera: Conjunctivae normal.      Pupils: Pupils are equal, round, and reactive to light. Neck:      Vascular: No JVD. Trachea: No tracheal deviation. Comments: Midline inferior C-spine tenderness. Negative axial loading. Limited range of motion for lateral rotation limited on the right to 15 degrees and to the left to 25 degrees. Cardiovascular:      Rate and Rhythm: Normal rate and regular rhythm. Heart sounds: Normal heart sounds. No murmur heard. No friction rub. No gallop. Pulmonary:      Effort: Pulmonary effort is normal. No respiratory distress. Breath sounds: Normal breath sounds. No wheezing or rales. Abdominal:      General: Bowel sounds are normal. There is no distension. Palpations: Abdomen is soft. There is no mass. Tenderness: There is no abdominal tenderness. There is no guarding or rebound. Musculoskeletal:         General: Tenderness present. Normal range of motion. Cervical back: Normal range of motion. Rigidity and tenderness present. Comments: Left shoulder: AB duction 90 extension 45 forward flexion 160 external rotation 45 internal rotation 25 strong radial pulse. Nontender elbow. There is anterior and posterior superior joint line tenderness. Skin:     General: Skin is warm and dry. Findings: No rash. Neurological:      Mental Status: She is alert and oriented to person, place, and time. Cranial Nerves: No cranial nerve deficit. Deep Tendon Reflexes: Reflexes are normal and symmetric. Psychiatric:         Behavior: Behavior normal.          Diagnostic Study Results     Labs -     Recent Results (from the past 12 hour(s))   HCG URINE, QL    Collection Time: 09/20/22 10:30 AM   Result Value Ref Range    HCG urine, QL Negative NEG         Radiologic Studies -   XR SPINE CERV FLEX/EXT MAX 3 V    (Results Pending)   XR SHOULDER LT AP/LAT MIN 2 V    (Results Pending)     CT Results  (Last 48 hours)      None          CXR Results  (Last 48 hours)      None              Medical Decision Making   I am the first provider for this patient. I reviewed the vital signs, available nursing notes, past medical history, past surgical history, family history and social history. Vital Signs-Reviewed the patient's vital signs. Records Reviewed: Nursing Notes    Procedures:  Procedures    Provider Notes (Medical Decision Making): no acute changes on x-rays. Treat w/toradol here; home with flexeril and ibuprofen. MED RECONCILIATION:  No current facility-administered medications for this encounter. Current Outpatient Medications   Medication Sig    ibuprofen (MOTRIN) 800 mg tablet Take 1 Tablet by mouth every eight (8) hours for 5 days. cyclobenzaprine (FLEXERIL) 10 mg tablet Take 1 Tablet by mouth three (3) times daily.     methocarbamoL (Robaxin-750) 750 mg tablet Take 2 Tablets by mouth nightly as needed for Muscle Spasm(s) or Pain.    metoprolol tartrate (LOPRESSOR) 25 mg tablet Take 0.5 Tabs by mouth two (2) times a day. escitalopram oxalate (LEXAPRO) 20 mg tablet Take 1 Tab by mouth daily. Disposition:  home    DISCHARGE NOTE:   11:16 AM    Pt has been reexamined. Patient has no new complaints, changes, or physical findings. Care plan outlined and precautions discussed. Results of x-rays were reviewed with the patient. All medications were reviewed with the patient; will d/c home with iburofen, flexeril. All of pt's questions and concerns were addressed. Patient was instructed and agrees to follow up with PCP, ortho, as well as to return to the ED upon further deterioration. Patient is ready to go home. Follow-up Information       Follow up With Specialties Details Why Contact Info    Matias Johnson NP Nurse Practitioner Schedule an appointment as soon as possible for a visit in 2 days  [address] [facility]      Robi Quan MD Orthopedic Surgery Schedule an appointment as soon as possible for a visit in 1 day  [address] [address]  [phone]      [facility] EMERGENCY DEPT Emergency Medicine  If symptoms worsen return immediately [address]  [phone]            Current Discharge Medication List        START taking these medications    Details   ibuprofen (MOTRIN) 800 mg tablet Take 1 Tablet by mouth every eight (8) hours for 5 days. Qty: 15 Tablet, Refills: 0  Start date: 9/20/2022, End date: 9/25/2022      cyclobenzaprine (FLEXERIL) 10 mg tablet Take 1 Tablet by mouth three (3) times daily. Qty: 12 Tablet, Refills: 0  Start date: 9/20/2022                 Diagnosis     Clinical Impression:   1.  Torticollis

## 2022-09-26 ENCOUNTER — OFFICE VISIT (OUTPATIENT)
Dept: ORTHOPEDIC SURGERY | Age: 32
End: 2022-09-26
Payer: COMMERCIAL

## 2022-09-26 VITALS
BODY MASS INDEX: 40.02 KG/M2 | RESPIRATION RATE: 14 BRPM | HEIGHT: 67 IN | HEART RATE: 71 BPM | WEIGHT: 255 LBS | OXYGEN SATURATION: 98 %

## 2022-09-26 DIAGNOSIS — M99.08 RIB CAGE REGION SOMATIC DYSFUNCTION: ICD-10-CM

## 2022-09-26 DIAGNOSIS — M99.01 CERVICAL SOMATIC DYSFUNCTION: ICD-10-CM

## 2022-09-26 DIAGNOSIS — M50.023 CERVICAL DISC DISORDER AT C6-C7 LEVEL WITH MYELOPATHY: ICD-10-CM

## 2022-09-26 DIAGNOSIS — M99.05 PELVIC SOMATIC DYSFUNCTION: ICD-10-CM

## 2022-09-26 DIAGNOSIS — M99.02 THORACIC REGION SOMATIC DYSFUNCTION: ICD-10-CM

## 2022-09-26 DIAGNOSIS — M54.12 CERVICAL RADICULOPATHY AT C6: Primary | ICD-10-CM

## 2022-09-26 DIAGNOSIS — M99.06 LOWER LIMB REGION SOMATIC DYSFUNCTION: ICD-10-CM

## 2022-09-26 DIAGNOSIS — M99.09 SOMATIC DYSFUNCTION OF ABDOMINAL REGION: ICD-10-CM

## 2022-09-26 DIAGNOSIS — M99.04 SACRAL REGION SOMATIC DYSFUNCTION: ICD-10-CM

## 2022-09-26 DIAGNOSIS — M99.07 UPPER EXTREMITY SOMATIC DYSFUNCTION: ICD-10-CM

## 2022-09-26 DIAGNOSIS — M99.03 LUMBAR REGION SOMATIC DYSFUNCTION: ICD-10-CM

## 2022-09-26 DIAGNOSIS — M54.12 CERVICAL RADICULOPATHY AT C6: ICD-10-CM

## 2022-09-26 PROCEDURE — 98929 OSTEOPATH MANJ 9-10 REGIONS: CPT | Performed by: FAMILY MEDICINE

## 2022-09-26 PROCEDURE — 99204 OFFICE O/P NEW MOD 45 MIN: CPT | Performed by: FAMILY MEDICINE

## 2022-09-26 RX ORDER — PREDNISONE 20 MG/1
TABLET ORAL
Qty: 22 TABLET | Refills: 0 | Status: SHIPPED | OUTPATIENT
Start: 2022-09-26

## 2022-09-26 NOTE — PROGRESS NOTES
HISTORY OF PRESENT ILLNESS    Loni Jiménez 1990 is a 32y.o. year old female comes in today as new patient for: back pain, numbness into legs    Patients symptoms have been present for 6 days when waking w/o injury. Pain level 7/10 neck/upper back into left shoulder. It has improved with ER visit and toradol injection, ibuprofen, flexeril. Patient has tried:  heat is best.  It is described as pain and numbness left hand and into left leg as well. IMAGING: XR cervical 9/20/2022 images reviewed and agree with:  IMPRESSION  1. Decreased disc height at C5-6.  2.  No acute fracture or subluxation. 3.  No instability. 4.  Fixed kyphotic curvature of the cervical spine. XR left shoulder 9/20/2022  Negative study. Past Surgical History:   Procedure Laterality Date    HX GYN      left ovarian cyst removed    HX SVT ABLATION      HX TONSIL AND ADENOIDECTOMY      HX TONSILLECTOMY  no date noted in HX    tonsils, adnoids & tues     Social History     Socioeconomic History    Marital status:    Tobacco Use    Smoking status: Never    Smokeless tobacco: Never   Substance and Sexual Activity    Alcohol use: Yes     Alcohol/week: 0.0 standard drinks     Comment: occ    Drug use: No    Sexual activity: Yes     Partners: Male     Birth control/protection: None   Social History Narrative    ** Merged History Encounter **           Current Outpatient Medications   Medication Sig Dispense Refill    cyclobenzaprine (FLEXERIL) 10 mg tablet Take 1 Tablet by mouth three (3) times daily. 12 Tablet 0    methocarbamoL (Robaxin-750) 750 mg tablet Take 2 Tablets by mouth nightly as needed for Muscle Spasm(s) or Pain. 30 Tablet 0    metoprolol tartrate (LOPRESSOR) 25 mg tablet Take 0.5 Tabs by mouth two (2) times a day. 30 Tab 2    escitalopram oxalate (LEXAPRO) 20 mg tablet Take 1 Tab by mouth daily.  80 Tab 1     Past Medical History:   Diagnosis Date    Anemia     Hypertension     pregnancy induced HTN Mass of heart     Neck pain     Spon  w/o complication     SVT (supraventricular tachycardia) (HCC)     SVT (supraventricular tachycardia) (HCC)      Family History   Problem Relation Age of Onset    Emphysema Mother     Cancer Neg Hx     Diabetes Neg Hx     Heart Disease Neg Hx     Hypertension Neg Hx     Stroke Neg Hx          ROS:  No incont, fever      Objective:  Pulse 71   Resp 14   Ht 5' 7\" (1.702 m)   Wt 255 lb (115.7 kg)   SpO2 98%   BMI 39.94 kg/m²   NEURO:  Sensation intact to light touch but absent left forearm-hand thumb to 4th finger. Reflexes +1/4 biceps and triceps left. M/S:  Examined seated and supine. Slump negative. Standing flexion test positive left  Sphinx test positive left. ASIS low left  Iliac crests equal bilaterally Pubes equal left Medial malleolus low left  Sacral base posterior left  DANAE low right  TTA at C3 on left worse flexion, T2, 3 on left worse flexion, and L2, 3 on left worse flexion  Rib(s) 1, 2 left TTP and superior LE Strength +5/5 bilaterally horacic diaphragm restricted left. Scapula motion restricted w/ TTA left. Hip flexion limited right. Assessment/Plan:     ICD-10-CM ICD-9-CM    1. Cervical radiculopathy at C6  M54.12 723. 4 predniSONE (DELTASONE) 20 mg tablet      MRI CERV SPINE WO CONT      2. Cervical disc disorder at C6-C7 level with myelopathy  M50.023 722.71 predniSONE (DELTASONE) 20 mg tablet      MRI CERV SPINE WO CONT      3. Lumbar region somatic dysfunction  M99.03 739.3 HI OSTEOPATHIC MANIP,9-10 BODY REGN      4. Pelvic somatic dysfunction  M99.05 739.5 HI OSTEOPATHIC MANIP,9-10 BODY REGN      5. Sacral region somatic dysfunction  M99.04 739.4 HI OSTEOPATHIC MANIP,9-10 BODY REGN      6. Thoracic region somatic dysfunction  M99.02 739.2 HI OSTEOPATHIC MANIP,9-10 BODY REGN      7. Rib cage region somatic dysfunction  M99.08 739.8 HI OSTEOPATHIC MANIP,9-10 BODY REGN      8.  Cervical somatic dysfunction  M99.01 739.1 HI OSTEOPATHIC [address]      9. Upper extremity somatic dysfunction  M99.07 739.7 NM OSTEOPATHIC MANIP,9-10 BODY REGN      10. Lower limb region somatic dysfunction  M99.06 739.6 NM OSTEOPATHIC MANIP,9-10 BODY REGN      11. Somatic dysfunction of abdominal region  M99.09 739.9 [address]          Patient (or guardian if minor) verbalizes understanding of evaluation and plan. Verbal consent obtained. Cervical, Thoracic, Rib, Lumbar, Pelvic, Sacral, Upper Ext, Lower Ext, and Abdominal  SD treated with myofascial and ME. Correction of previous malalignments verified after Tx. Pt tolerated well. Notes improvement of Sx and pain is now rated 4/10. HEP/stretches daily. Discussed stretching/strengthening/posture. Will start HEP, PT, and Rx for prednisone taper and await MRI  as above and plan follow-up for results.

## 2022-09-26 NOTE — LETTER
NOTIFICATION RETURN TO WORK / SCHOOL    9/26/2022 2:36 PM    Ms. PEÑALOZA/ [address]      To Whom It May Concern:    Miesha William Strickland is currently under the care of Danielle Graff 2.. She will return to work/school on: 9/27/2022. Light duty until after follow up for MRI results. If there are questions or concerns please have the patient contact our office.         Sincerely,      Romel Araiza, DO

## 2022-09-26 NOTE — LETTER
9/26/2022    Patient: Anastacia Steinberg   YOB: 1990   Date of Visit: 9/26/2022     Bhargav Francois NP  [address] [address]  Via Fax: [phone]    Dear Bhargav Francois NP,      Thank you for referring Ms. Miesha Sainz to [place] [address]. for evaluation. My notes for this consultation are attached. If you have questions, please do not hesitate to call me. I look forward to following your patient along with you.       Sincerely,    Viky Castillo, DO

## 2022-10-19 ENCOUNTER — HOSPITAL ENCOUNTER (OUTPATIENT)
Age: 32
Discharge: HOME | End: 2022-10-19
Attending: FAMILY MEDICINE
Payer: COMMERCIAL

## 2022-10-19 PROCEDURE — 72141 MRI NECK SPINE W/O DYE: CPT

## 2022-10-24 ENCOUNTER — OFFICE VISIT (OUTPATIENT)
Dept: ORTHOPEDIC SURGERY | Age: 32
End: 2022-10-24
Payer: COMMERCIAL

## 2022-10-24 VITALS — HEIGHT: 67 IN | WEIGHT: 255 LBS | BODY MASS INDEX: 40.02 KG/M2 | RESPIRATION RATE: 14 BRPM

## 2022-10-24 DIAGNOSIS — M99.09 SOMATIC DYSFUNCTION OF ABDOMINAL REGION: ICD-10-CM

## 2022-10-24 DIAGNOSIS — M99.05 PELVIC SOMATIC DYSFUNCTION: ICD-10-CM

## 2022-10-24 DIAGNOSIS — M99.04 SACRAL REGION SOMATIC DYSFUNCTION: ICD-10-CM

## 2022-10-24 DIAGNOSIS — M99.07 UPPER EXTREMITY SOMATIC DYSFUNCTION: ICD-10-CM

## 2022-10-24 DIAGNOSIS — M50.023 CERVICAL DISC DISORDER AT C6-C7 LEVEL WITH MYELOPATHY: ICD-10-CM

## 2022-10-24 DIAGNOSIS — M99.06 LOWER LIMB REGION SOMATIC DYSFUNCTION: ICD-10-CM

## 2022-10-24 DIAGNOSIS — M99.01 CERVICAL SOMATIC DYSFUNCTION: ICD-10-CM

## 2022-10-24 DIAGNOSIS — M99.03 LUMBAR REGION SOMATIC DYSFUNCTION: ICD-10-CM

## 2022-10-24 DIAGNOSIS — M99.08 RIB CAGE REGION SOMATIC DYSFUNCTION: ICD-10-CM

## 2022-10-24 DIAGNOSIS — M54.12 CERVICAL RADICULOPATHY AT C6: Primary | ICD-10-CM

## 2022-10-24 DIAGNOSIS — M99.02 THORACIC REGION SOMATIC DYSFUNCTION: ICD-10-CM

## 2022-10-24 PROCEDURE — 98929 OSTEOPATH MANJ 9-10 REGIONS: CPT | Performed by: FAMILY MEDICINE

## 2022-10-24 PROCEDURE — 99214 OFFICE O/P EST MOD 30 MIN: CPT | Performed by: FAMILY MEDICINE

## 2022-10-24 RX ORDER — CYCLOBENZAPRINE HCL 10 MG
10 TABLET ORAL 3 TIMES DAILY
Qty: 12 TABLET | Refills: 0 | Status: SHIPPED | OUTPATIENT
Start: 2022-10-24

## 2022-10-24 RX ORDER — DICLOFENAC SODIUM 50 MG/1
50 TABLET, DELAYED RELEASE ORAL 2 TIMES DAILY
Qty: 60 TABLET | Refills: 1 | Status: SHIPPED | OUTPATIENT
Start: 2022-10-24

## 2022-10-24 NOTE — LETTER
10/24/2022    Patient: Krzysztof Gan   YOB: 1990   Date of Visit: 10/24/2022     Nancy Mendoza NP  [address] [address]  Via Fax: [phone]    Dear Nancy Mendoza NP,      Thank you for referring Ms. Miesha Sainz to [place] [address]. for evaluation. My notes for this consultation are attached. If you have questions, please do not hesitate to call me. I look forward to following your patient along with you.       Sincerely,    Patience Dalton, DO

## 2022-10-24 NOTE — PROGRESS NOTES
HISTORY OF PRESENT ILLNESS    Nilsa Sandhoff Doyal Mettle 1990 is a 32y.o. year old female comes in today to be evaluated and treated for: neck pain, left hand numbness    Since last appt has noticed pain improved with prednisone taper. Pain level 6/10. Pain/numbness has returned since finishing prednisone. Has had anemia in past but no checked in a while (MRI shower marrow changes) - sees PCP [id number]. IMAGING: MRI cervical 10/19/2022  IMPRESSION  1. Mildly motion degraded examination. 2. Degenerative disc disease and disc protrusion at C5-6 with mild ventral cord  flattening and mild compromise of the canal. No myelopathy.  -Also likely left C5-6 foraminal protrusion compressing on the left C6 foraminal  nerve root, correlate clinically. 3. Diffuse homogeneous increased hematopoietic bone marrow signal likely due to  anemia and can be seen in the setting of smoking and obesity, cannot exclude  myeloproliferative disorder, recommend correlation with lab values. XR cervical 9/20/2022 images reviewed and agree with:  IMPRESSION  1. Decreased disc height at C5-6.  2.  No acute fracture or subluxation. 3.  No instability. 4.  Fixed kyphotic curvature of the cervical spine. XR left shoulder 9/20/2022  Negative study. Past Surgical History:   Procedure Laterality Date    HX GYN      left ovarian cyst removed    HX SVT ABLATION      HX TONSIL AND ADENOIDECTOMY      HX TONSILLECTOMY  no date noted in HX    tonsils, adnoids & tues     Social History     Socioeconomic History    Marital status:    Tobacco Use    Smoking status: Never    Smokeless tobacco: Never   Substance and Sexual Activity    Alcohol use:  Yes     Alcohol/week: 0.0 standard drinks     Comment: occ    Drug use: No    Sexual activity: Yes     Partners: Male     Birth control/protection: None   Social History Narrative    ** Merged History Encounter **          Current Outpatient Medications   Medication Sig Dispense Refill predniSONE (DELTASONE) 20 mg tablet Take 2 tabs in AM with food for 7 days then 1 tab until gone 22 Tablet 0    cyclobenzaprine (FLEXERIL) 10 mg tablet Take 1 Tablet by mouth three (3) times daily. 12 Tablet 0    methocarbamoL (Robaxin-750) 750 mg tablet Take 2 Tablets by mouth nightly as needed for Muscle Spasm(s) or Pain. 30 Tablet 0    metoprolol tartrate (LOPRESSOR) 25 mg tablet Take 0.5 Tabs by mouth two (2) times a day. 30 Tab 2    escitalopram oxalate (LEXAPRO) 20 mg tablet Take 1 Tab by mouth daily. 80 Tab 1     Past Medical History:   Diagnosis Date    Anemia     Hypertension     pregnancy induced HTN    Mass of heart     Neck pain     Spon  w/o complication     SVT (supraventricular tachycardia) (HCC)     SVT (supraventricular tachycardia) (HCC)      Family History   Problem Relation Age of Onset    Emphysema Mother     Cancer Neg Hx     Diabetes Neg Hx     Heart Disease Neg Hx     Hypertension Neg Hx     Stroke Neg Hx          ROS:  No incont, fever    Objective:  Resp 14   Ht 5' 7\" (1.702 m)   Wt 255 lb (115.7 kg)   BMI 39.94 kg/m²   NEURO:  Sensation intact to light touch but absent left forearm-hand thumb to 4th finger. Reflexes +1/4 biceps and triceps left. M/S:  Examined seated and supine. Slump negative. [place] + left Standing flexion test positive left  Sphinx test positive left. ASIS low left  Iliac crests equal bilaterally Pubes equal left Medial malleolus low left  Sacral base posterior left  DANAE low right  TTA at C3 on left worse flexion, T2, 3 on left worse flexion, and L3 on left worse flexion  Rib(s) 1, 2 left TTP and superior LE Strength +5/5 bilaterally horacic diaphragm restricted left. Scapula motion restricted w/ TTA left. Hip flexion limited right. Assessment/Plan:     ICD-10-CM ICD-9-CM    1.  Cervical radiculopathy at C6  M54.12 723.4 REFERRAL TO PHYSICIAL MEDICINE REHAB      cyclobenzaprine (FLEXERIL) 10 mg tablet      diclofenac EC (VOLTAREN) 50 mg EC tablet      2. Cervical disc disorder at C6-C7 level with myelopathy  M50.023 722.71 REFERRAL TO PHYSICIAL MEDICINE REHAB      cyclobenzaprine (FLEXERIL) 10 mg tablet      diclofenac EC (VOLTAREN) 50 mg EC tablet      3. Lumbar region somatic dysfunction  M99.03 739.3 MT OSTEOPATHIC MANIP,9-10 BODY REGN      4. Pelvic somatic dysfunction  M99.05 739.5 MT OSTEOPATHIC MANIP,9-10 BODY REGN      5. Sacral region somatic dysfunction  M99.04 739.4 MT OSTEOPATHIC MANIP,9-10 BODY REGN      6. Rib cage region somatic dysfunction  M99.08 739.8 MT OSTEOPATHIC MANIP,9-10 BODY REGN      7. Cervical somatic dysfunction  M99.01 739.1 MT OSTEOPATHIC MANIP,9-10 BODY REGN      8. Upper extremity somatic dysfunction  M99.07 739.7 MT OSTEOPATHIC MANIP,9-10 BODY REGN      9. Lower limb region somatic dysfunction  M99.06 739.6 MT OSTEOPATHIC MANIP,9-10 BODY REGN      10. Somatic dysfunction of abdominal region  M99.09 739.9 MT OSTEOPATHIC MANIP,9-10 BODY REGN      11. Thoracic region somatic dysfunction  M99.02 739.2 MT OSTEOPATHIC MANIP,9-10 BODY REGN          Patient (or guardian if minor) verbalizes understanding of evaluation and plan. Verbal consent obtained. Cervical, Thoracic, Rib, Lumbar, Pelvic, Sacral, Upper Ext, Lower Ext, and Abdominal SD treated with myofascial and ME. Correction of previous malalignments verified after Tx. Pt tolerated well. Notes improvement of Sx and pain is now rated 2/10. HEP/stretches daily. Discussed stretching/strengthening/posture. Will start voltaren 50mg w/ flexril 10mg and refer PMR for possible interventions as above and plan follow-up as needed here.

## 2023-01-11 ENCOUNTER — OFFICE VISIT (OUTPATIENT)
Dept: ORTHOPEDIC SURGERY | Age: 33
End: 2023-01-11
Payer: COMMERCIAL

## 2023-01-11 VITALS
WEIGHT: 247.2 LBS | HEART RATE: 84 BPM | TEMPERATURE: 97.6 F | RESPIRATION RATE: 18 BRPM | DIASTOLIC BLOOD PRESSURE: 83 MMHG | HEIGHT: 67 IN | OXYGEN SATURATION: 96 % | BODY MASS INDEX: 38.8 KG/M2 | SYSTOLIC BLOOD PRESSURE: 133 MMHG

## 2023-01-11 DIAGNOSIS — M54.12 CERVICAL RADICULOPATHY AT C6: Primary | ICD-10-CM

## 2023-01-11 DIAGNOSIS — M25.512 TRIGGER POINT OF LEFT SHOULDER REGION: ICD-10-CM

## 2023-01-11 DIAGNOSIS — M50.10 CERVICAL DISC PROLAPSE WITH RADICULOPATHY: ICD-10-CM

## 2023-01-11 RX ORDER — BUPIVACAINE HYDROCHLORIDE 2.5 MG/ML
1 INJECTION, SOLUTION INFILTRATION; PERINEURAL ONCE
Status: COMPLETED | OUTPATIENT
Start: 2023-01-11 | End: 2023-01-11

## 2023-01-11 RX ORDER — PREGABALIN 50 MG/1
50-100 CAPSULE ORAL
Qty: 60 CAPSULE | Refills: 1 | Status: SHIPPED | OUTPATIENT
Start: 2023-01-11

## 2023-01-11 RX ORDER — DICLOFENAC SODIUM 50 MG/1
50 TABLET, DELAYED RELEASE ORAL
Qty: 60 TABLET | Refills: 1 | Status: SHIPPED | OUTPATIENT
Start: 2023-01-11

## 2023-01-11 RX ORDER — BETAMETHASONE SODIUM PHOSPHATE AND BETAMETHASONE ACETATE 3; 3 MG/ML; MG/ML
12 INJECTION, SUSPENSION INTRA-ARTICULAR; INTRALESIONAL; INTRAMUSCULAR; SOFT TISSUE ONCE
Status: COMPLETED | OUTPATIENT
Start: 2023-01-11 | End: 2023-01-11

## 2023-01-11 RX ADMIN — BUPIVACAINE HYDROCHLORIDE 2.5 MG: 2.5 INJECTION, SOLUTION INFILTRATION; PERINEURAL at 11:55

## 2023-01-11 RX ADMIN — BETAMETHASONE SODIUM PHOSPHATE AND BETAMETHASONE ACETATE 12 MG: 3; 3 INJECTION, SUSPENSION INTRA-ARTICULAR; INTRALESIONAL; INTRAMUSCULAR; SOFT TISSUE at 11:55

## 2023-01-11 NOTE — LETTER
1/11/2023    Patient: Roshni Arguello   YOB: 1990   Date of Visit: 1/11/2023     Milagro Arndt NP  [address] [address]  Via Fax: [address], [address]  [address] [address]  V[place]    Dear TIN Perez DO,      Thank you for referring Ms. Torres Miranda to [facility] for evaluation. My notes for this consultation are attached. If you have questions, please do not hesitate to call me. I look forward to following your patient along with you.       Sincerely,    Ana Roy MD

## 2023-01-11 NOTE — PROGRESS NOTES
[address]  [address], [address]  [place], [address] [place] [address] Street  Phone: [phone]  Fax: [phone]        Pau Saenz  : 1990  PCP: Librado Barraza NP    NEW PATIENT EVALUATION      ASSESSMENT AND PLAN    Diagnoses and all orders for this visit:    1. Cervical radiculopathy at C6  -     diclofenac EC (VOLTAREN) 50 mg EC tablet; Take 1 Tablet by mouth two (2) times daily as needed for Pain. -     pregabalin (Lyrica) 50 mg capsule; Take 1-2 Capsules by mouth nightly. Max Daily Amount: 100 mg.  -     REFERRAL TO PHYSICAL THERAPY    2. Trigger point of left shoulder region    3. Cervical disc prolapse with radiculopathy  -     diclofenac EC (VOLTAREN) 50 mg EC tablet; Take 1 Tablet by mouth two (2) times daily as needed for Pain.  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 She has mild weakness. Discussed conservative versus surgical options. She has had little in the way of focused conservative care. Avoid overhead lifting or reaching. Avoid lifting objects greater than 15 pounds. Referral to Physical Therapy  Trial of Lyrica  mg QHS  Trial of Voltaren 50 mg BID PRN  Trigger point injections left levator scapula             HISTORY OF PRESENT ILLNESS  Miesha Fallon is seen today in consultation for neck pain. She reports neck pain radiating into her LUE. Her pain is exacerbated with lifting and working. Pt last worked as a  2023. She has intermittent numbness and tingling in her LUE to the second and third digit. Pt occasionally gets posterior headaches. She has difficulty sleeping due to her neck pain. Pt usually wakes up every 2 hours. Pt also complains of numbness and tingling in her left anterior thigh intermittently. She is unable to tolerate Gabapentin. Pt did not like how it made her felt. Denies persistent fevers, chills, weight changes, saddle paresthesias, and neurogenic bowel or bladder symptoms. Pain Assessment  1/11/2023   Location of Pain Back;Neck; Shoulder   Location Modifiers -   Severity of Pain 5   Quality of Pain Dull;Aching   Quality of Pain Comment -   Duration of Pain Persistent   Frequency of Pain Intermittent   Aggravating Factors Other (Comment)   Aggravating Factors Comment sleeping, lifting, work   Limiting Behavior Yes   Relieving Factors Heat   Relieving Factors Comment -   Result of Injury No       Onset of pain: 5/2022, intermittent      Investigations:   C MRI 10/2022:   Spine surgery consult: none    Treatments:  Physical therapy: no  Spinal injections: no  Spinal surgery- no  Beneficial medications: Prednisone, Flexeril, Robaxin, Voltaren  Failed medications: Gabapentin-cognitive issues    Work Status:  for [employer], last worked 1/6/2023  Pertinent PMHx:  SVT (status post ablation), depression, HLD. Visit Vitals  /83 (BP 1 Location: Left upper arm, BP Patient Position: Sitting, BP Cuff Size: Adult)   Pulse 84   Temp 97.6 °F (36.4 °C) (Temporal)   Resp 18   Ht 5' 7\" (1.702 m)   Wt 247 lb 3.2 oz (112.1 kg)   SpO2 96% Comment: RA   BMI 38.72 kg/m²       PHYSICAL EXAM    Trigger point left levator scapula  Negative Spurling's, Saini's, Tinel's  UE strength intact except intrinsics weakness 4/5  DTR 2+  FROM shoulders    [facility]  OFFICE PROCEDURE PROGRESS NOTE      PROCEDURE: In the office today after informed consent using aseptic technique, the patient was injected with a total of 2 cc of 6 mg/cc Celestone and 1 cc Marcaine into her left levator scapula trigger point.      Chart reviewed for the following:   I, Dr. Karli Holguin, have reviewed the History, Physical and updated the Allergic reactions for [facility] Sabrina Angulo. Local measures (ice/heat) and medications have not alleviated the symptoms. TIME OUT performed immediately prior to start of procedure:   I, Dr. Tami Angela, have performed the following reviews on Miesha Angulo prior to the start of the procedure:       Patient denies any recent fevers, chills, antibiotics, recent cortisone injections, or infections. * Patient was identified by name and date of birth   * Agreement on procedure being performed was verified  * Risks and Benefits explained to the patient  * Procedure site verified and marked as necessary  * Patient was positioned for comfort  * Consent was signed and verified     Time: 11:50 AM    Date of procedure: 2023    Procedure performed by:  Faith Pendleton MD    Provider assisted by: None    Patient assisted by: Self    How tolerated by patient: Pt tolerated the procedure well with no complications. Past Medical History:   Diagnosis Date    Anemia     Hypertension     pregnancy induced HTN    Mass of heart     Neck pain     Spon  w/o complication 401    SVT (supraventricular tachycardia) (HCC)     SVT (supraventricular tachycardia) (HCC)         Past Surgical History:   Procedure Laterality Date    HX GYN      left ovarian cyst removed    HX SVT ABLATION      HX TONSIL AND ADENOIDECTOMY      HX TONSILLECTOMY  no date noted in HX    tonsils, adnoids & tues         Current Outpatient Medications   Medication Sig Dispense Refill    diclofenac EC (VOLTAREN) 50 mg EC tablet Take 1 Tablet by mouth two (2) times daily as needed for Pain. 60 Tablet 1    pregabalin (Lyrica) 50 mg capsule Take 1-2 Capsules by mouth nightly. Max Daily Amount: 100 mg. 60 Capsule 1    metoprolol tartrate (LOPRESSOR) 25 mg tablet Take 0.5 Tabs by mouth two (2) times a day. 30 Tab 2    escitalopram oxalate (LEXAPRO) 20 mg tablet Take 1 Tab by mouth daily.  90 Tab 1

## 2023-01-11 NOTE — PROGRESS NOTES
Rosette Bryan presents today for   Chief Complaint   Patient presents with    Back Pain    Neck Pain    Shoulder Pain       Is someone accompanying this pt? Yes      Is the patient using any DME equipment during [address]? no    Depression Screening:  3 most recent PHQ Screens 2/5/2021   Little interest or pleasure in doing things Not at all   Feeling down, depressed, irritable, or hopeless Not at all   Total Score PHQ 2 0   Trouble falling or staying asleep, or sleeping too much -   Feeling tired or having little energy -   Poor appetite, weight loss, or overeating -   Feeling bad about yourself - or that you are a failure or have let yourself or your family down -   Trouble concentrating on things such as school, work, reading, or watching TV -   Moving or speaking so slowly that other people could have noticed; or the opposite being so fidgety that others notice -   Thoughts of being better off dead, or hurting yourself in some way -   PHQ 9 Score -   How difficult have these problems made it for you to do your work, take care of your home and get along with others -       Learning Assessment:  Learning Assessment 3/30/2020   PRIMARY LEARNER Patient   HIGHEST LEVEL OF EDUCATION - PRIMARY LEARNER  SOME COLLEGE   BARRIERS PRIMARY LEARNER NONE   CO-LEARNER CAREGIVER No   PRIMARY LANGUAGE ENGLISH   LEARNER PREFERENCE PRIMARY DEMONSTRATION   ANSWERED BY Patient   RELATIONSHIP SELF       Abuse Screening:  Abuse Screening Questionnaire 2/5/2021   Do you ever feel afraid of your partner? N   Are you in a relationship with someone who physically or mentally threatens you? N   Is it safe for you to go home? Y       Fall Risk  No flowsheet data found. OPIOID RISK TOOL  No flowsheet data found. Coordination of Care:  1. Have you been to the ER, urgent care clinic since your last visit? no  Hospitalized since your last visit? no    2.  Have you seen or consulted any other health care providers outside of the [facility] [address] since your last visit? no Include any pap smears or colon screening.  no

## 2023-01-11 NOTE — PROGRESS NOTES
Verbal order entered per Dr. Warner Senate as documented on blue sheet: trigger point injection- left levator scapula- 2 ml of celestone and 1 ml of marcaine

## 2023-01-19 ENCOUNTER — HOSPITAL ENCOUNTER (OUTPATIENT)
Dept: PHYSICAL THERAPY | Age: 33
Discharge: HOME | End: 2023-01-19
Attending: PHYSICAL MEDICINE & REHABILITATION
Payer: COMMERCIAL

## 2023-01-19 PROCEDURE — 97162 PT EVAL MOD COMPLEX 30 MIN: CPT

## 2023-01-19 NOTE — PROGRESS NOTES
[facility]  [address] [place] [address], [address] [place] [address], [address] [place] [address]  [phone] fax      Plan of Care/ Statement of Necessity for Physical Therapy Services    Patient name: Aly Luther Start of Care: 2023   Referral source: Tahira Sanchez MD : 1990    Medical Diagnosis: Neck pain [M54.2]  Payor: Gina Jacskon / Plan: Ernestine Cea / Product Type: HMO /  Onset Date:initial onset May 2022, acute flare Dec 2022    Treatment Diagnosis: neck pain with left UE radiculopathy   Prior Hospitalization: see medical history Provider#: [id number]   Medications: Verified on Patient summary List    Comorbidities: SVT, allergies, back pain, HA, prior c/s injury   Prior Level of Function: independent and active, 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 Assessment/ key information: Patient is a 28 y. o.female who presents to PT with Neck pain [M54.2]. She reports pain started in May 2022 and she went to the ER due to insidious onset of pain and numbness in the left UE. She presents with pain, numbness/tingling, and trigger points in the left upper quadrant that impair her ability to perform daily self care tasks and job related duties. The patient will benefit from skilled PT services to address these deficits and facilitate return to premorbid activity level and improved quality of life. Evaluation Complexity History MEDIUM  Complexity : 1-2 comorbidities / personal factors will impact the outcome/ POC ; Examination MEDIUM Complexity : 3 Standardized tests and measures addressing body structure, function, activity limitation and / or participation in recreation  ;Presentation MEDIUM Complexity : Evolving with changing characteristics  ; Clinical Decision Making MEDIUM Complexity : FOTO score of 26-74  Overall Complexity Rating: MEDIUM  Problem List: pain affecting function, decrease ROM, decrease strength, decrease ADL/ functional abilitiies, decrease activity tolerance, and decrease flexibility/ joint mobility   Treatment Plan may include any combination of the following: Therapeutic exercise, Neuromuscular reeducation, Manual therapy, Therapeutic activity, Self care/home management, Electric stim unattended , Ultrasound, Mechanical traction, and Electric stim attended  Patient / Family readiness to learn indicated by: asking questions, trying to perform skills, and interest  Persons(s) to be included in education: patient (P)  Barriers to Learning/Limitations: None  Patient Goal (s): get the feeling back in my arm/hand  Patient Self Reported Health Status: good  Rehabilitation Potential: good    Short Term Goals: To be accomplished in 1 weeks:   1. Establish HEP for ROM and strengthening    Long Term Goals: To be accomplished in 4 weeks:   1. Patient will be independent and compliant with HEP for ROM and strengthening   2. Patient will increase FOTO > 10 pts to increase functional mobility within the home and community. 3. Patient will report <3/10 pain with mobility to increase functional activity tolerance  4. Patient will demonstrate full and painfree c/s AROM to facilitate return to premorbid activity level. 5. Patient will report 25% decreased frequency and intensity of radicular symptoms to decrease nerve impingement and functional use of left UE. Frequency / Duration: Patient to be seen 2 times per week for 4 weeks. Patient/ Caregiver education and instruction: Diagnosis, prognosis, self care, activity modification, and exercises   [x]  Plan of care has been reviewed with SERGEI Dawson, PT 1/19/2023 12:05 PM  ________________________________________________________________________    I certify that the above Therapy Services are being furnished while the patient is under my care.  I agree with the treatment plan and certify that this therapy is necessary.     [de-identified] Signature:____________Date:_________TIME:________     Perla Henao MD  ** Signature, Date and Time must be completed for valid certification **      Please sign and return to In [address] [place] [address] [address]  [address] [place] [address], [address] [place] [address], [address] [facility] [address]  [phone] fax

## 2023-01-19 NOTE — PROGRESS NOTES
PT DAILY TREATMENT NOTE     PPatient Name: Michi Hand  Date:2023  : 1990  [x]  Patient  Verified  Payor: Tina Elise / Plan: Mei Zuniga / Product Type: HMO /    In time:1105  Out time:1140  Total Treatment Time (min): 35  Visit #: 1 of 8    Medicare/BCBS Only   Total Timed Codes (min):  0 1:1 Treatment Time:  35       Treatment Area: Neck pain [M54.2]    SUBJECTIVE  Pain Level (0-10 scale): 3  Any medication changes, allergies to medications, adverse drug reactions, diagnosis change, or new procedure performed?: [x] No    [] Yes (see summary sheet for update)  Subjective functional status/changes:   [x] see eval form in paper chart    OBJECTIVE    35 min []Eval                  []Re-Eval            With   [] TE   [] TA   [] neuro   [] other: Patient Education: [x] Review HEP    [] Progressed/Changed HEP based on:   [] positioning   [] body mechanics   [] transfers   [] heat/ice application    [] other:        Pain Level (0-10 scale) post treatment: 2    ASSESSMENT/Changes in Function:       [x]  See Plan of Care  []  See progress note/recertification  []  See Discharge Summary         Goals:  Short Term Goals: To be accomplished in 1 weeks:               1. Establish HEP for ROM and strengthening  Eval: established and reviewed     Long Term Goals: To be accomplished in 4 weeks:               1. Patient will be independent and compliant with HEP for ROM and strengthening      Eval: established and reviewed          2. Patient will increase FOTO > 10 pts to increase functional mobility within the home and community. Eval: 36  3. Patient will report <3/10 pain with mobility to increase functional activity tolerance  Eval: 10/10 at worst  4. Patient will demonstrate full and painfree c/s AROM to facilitate return to premorbid activity level. Eval: left rotation dec 50% with pain  5.  Patient will report 25% decreased frequency and intensity of radicular symptoms to decrease nerve impingement and functional use of left UE.   Eval: constant n/t in first 2 digits, whole hand on \"bad days\"       PLAN  []  Upgrade activities as tolerated     [x]  Continue plan of care  []  Update interventions per flow sheet       []  Discharge due to:_  []  Other:_      Mehdi Gold, PT 1/19/2023  12:16 PM    Future Appointments   Date Time Provider Marcos Kimbrough   3/1/2023  2:20 PM Nancie Walden MD [facility]

## 2023-01-26 ENCOUNTER — HOSPITAL ENCOUNTER (OUTPATIENT)
Dept: PHYSICAL THERAPY | Age: 33
Discharge: HOME | End: 2023-01-26
Attending: PHYSICAL MEDICINE & REHABILITATION
Payer: COMMERCIAL

## 2023-01-26 PROCEDURE — 97110 THERAPEUTIC EXERCISES: CPT

## 2023-01-26 PROCEDURE — 97112 NEUROMUSCULAR REEDUCATION: CPT

## 2023-01-26 NOTE — PROGRESS NOTES
PT DAILY TREATMENT NOTE     Patient Name: Vishal Generous  Date:2023  : 1990  [x]  Patient  Verified  Payor: Dayna Xiao / Plan: Krysta Ventura / Product Type: HMO /    In time:301  Out time:342  Total Treatment Time (min): 41  Visit #: 2 of 8    Medicare/BCBS Only   Total Timed Codes (min):  41 1:1 Treatment Time:  38       Treatment Area: Neck pain [M54.2]    SUBJECTIVE  Pain Level (0-10 scale): 3  Any medication changes, allergies to medications, adverse drug reactions, diagnosis change, or new procedure performed?: [x] No    [] Yes (see summary sheet for update)  Subjective functional status/changes:   [] No changes reported  Patient reports today is a pretty good day as far as her symptoms are concerned. Mild pain in her left shoulder and no numbness or tingling today. OBJECTIVE    18 min Therapeutic Exercise:  [x] See flow sheet :   Rationale: increase ROM and increase strength to improve the patients ability to increase activity tolerance     23 min Neuromuscular Re-education:  [x]  See flow sheet : cervical and scapular re-ed   Rationale: increase strength, improve coordination, improve balance, and increase proprioception  to improve the patients ability to tolerate sustained postures          With   [] TE   [] TA   [] neuro   [] other: Patient Education: [x] Review HEP    [] Progressed/Changed HEP based on:   [] positioning   [] body mechanics   [] transfers   [] heat/ice application    [] other:      Other Objective/Functional Measures: Initiated exercises per flow sheet     Pain Level (0-10 scale) post treatment: 3    ASSESSMENT/Changes in Function: Initiated exercise program per POC to which patient responded well. She reports improvement in pain and radicular symptoms with performance of HEP. Minimal cuing for UT hiking and for performance of cervical re-ed activities.  Discussed trial of manual traction at [place] and will transition to mechanical traction if she responds well. Patient will continue to benefit from skilled PT services to modify and progress therapeutic interventions, address functional mobility deficits, address ROM deficits, address strength deficits, analyze and address soft tissue restrictions, analyze and cue movement patterns, analyze and modify body mechanics/ergonomics, assess and modify postural abnormalities, address imbalance/dizziness, and instruct in home and community integration to attain remaining goals. []  See Plan of Care  []  See progress note/recertification  []  See Discharge Summary         Progress towards goals / Updated goals:  Short Term Goals: To be accomplished in 1 weeks:  1. Establish HEP for ROM and strengthening  Eval: established and reviewed  Reports compliance   Long Term Goals: To be accomplished in 4 weeks:  1. Patient will be independent and compliant with HEP for ROM and strengthening      Eval: established and reviewed  Reports compliance          2. Patient will increase FOTO > 10 pts to increase functional mobility within the home and community. Eval: 36  Reassess at 30 days  3. Patient will report <3/10 pain with mobility to increase functional activity tolerance  Eval: 10/10 at worst  4. Patient will demonstrate full and painfree c/s AROM to facilitate return to premorbid activity level. Eval: left rotation dec 50% with pain  5. Patient will report 25% decreased frequency and intensity of radicular symptoms to decrease nerve impingement and functional use of left UE.   Eval: constant n/t in first 2 digits, whole hand on \"bad days\"  Patient reports improvement in n/t and increased frequency of \"good days\"    PLAN  [x]  Upgrade activities as tolerated     [x]  Continue plan of care  []  Update interventions per flow sheet       []  Discharge due to:_  []  Other:_      Katherine Tinoco, PTA 1/26/2023  2:59 PM    Future Appointments   Date Time Provider Marcos Kimbrough   1/26/2023  3:00 PM Sandie Lloyd PTA [facility]   3/1/2023  2:20 PM Alka Diez MD [facility]

## 2023-01-30 ENCOUNTER — TELEPHONE (OUTPATIENT)
Dept: PHYSICAL THERAPY | Age: 33
End: 2023-01-30

## 2023-02-01 ENCOUNTER — APPOINTMENT (OUTPATIENT)
Dept: PHYSICAL THERAPY | Age: 33
End: 2023-02-01
Attending: PHYSICAL MEDICINE & REHABILITATION

## 2023-02-02 ENCOUNTER — HOSPITAL ENCOUNTER (OUTPATIENT)
Dept: PHYSICAL THERAPY | Age: 33
End: 2023-02-02
Attending: PHYSICAL MEDICINE & REHABILITATION

## 2023-02-06 ENCOUNTER — TELEPHONE (OUTPATIENT)
Dept: PHYSICAL THERAPY | Age: 33
End: 2023-02-06

## 2023-02-08 ENCOUNTER — APPOINTMENT (OUTPATIENT)
Dept: PHYSICAL THERAPY | Age: 33
End: 2023-02-08
Attending: PHYSICAL MEDICINE & REHABILITATION

## 2023-02-13 ENCOUNTER — APPOINTMENT (OUTPATIENT)
Dept: PHYSICAL THERAPY | Age: 33
End: 2023-02-13
Attending: PHYSICAL MEDICINE & REHABILITATION

## 2023-02-15 ENCOUNTER — APPOINTMENT (OUTPATIENT)
Dept: PHYSICAL THERAPY | Age: 33
End: 2023-02-15
Attending: PHYSICAL MEDICINE & REHABILITATION

## 2023-02-20 ENCOUNTER — APPOINTMENT (OUTPATIENT)
Dept: PHYSICAL THERAPY | Age: 33
End: 2023-02-20
Attending: PHYSICAL MEDICINE & REHABILITATION

## 2023-02-22 ENCOUNTER — APPOINTMENT (OUTPATIENT)
Dept: PHYSICAL THERAPY | Age: 33
End: 2023-02-22
Attending: PHYSICAL MEDICINE & REHABILITATION

## 2024-08-08 NOTE — TELEPHONE ENCOUNTER
Last Visit: 05/11/2017 with MD Ubaldo Lenz    Next Appointment: No show 07/03; noted to f/u after MRI   Previous Refill Encounters: 05/11/2017 per MD Ubaldo Lenz #[id number] with 1 refill    Requested Prescriptions     Pending Prescriptions Disp Refills    gabapentin (NEURONTIN) 300 mg capsule 60 Cap 1     Sig: Take 2 Caps by mouth nightly. Awake